# Patient Record
Sex: FEMALE | Race: WHITE | NOT HISPANIC OR LATINO
[De-identification: names, ages, dates, MRNs, and addresses within clinical notes are randomized per-mention and may not be internally consistent; named-entity substitution may affect disease eponyms.]

---

## 2018-05-11 ENCOUNTER — TRANSCRIPTION ENCOUNTER (OUTPATIENT)
Age: 58
End: 2018-05-11

## 2019-02-19 ENCOUNTER — TRANSCRIPTION ENCOUNTER (OUTPATIENT)
Age: 59
End: 2019-02-19

## 2021-08-31 ENCOUNTER — APPOINTMENT (OUTPATIENT)
Dept: GASTROENTEROLOGY | Facility: CLINIC | Age: 61
End: 2021-08-31
Payer: COMMERCIAL

## 2021-08-31 VITALS
HEIGHT: 63 IN | DIASTOLIC BLOOD PRESSURE: 78 MMHG | HEART RATE: 76 BPM | BODY MASS INDEX: 29.23 KG/M2 | SYSTOLIC BLOOD PRESSURE: 134 MMHG | WEIGHT: 165 LBS

## 2021-08-31 DIAGNOSIS — Z78.9 OTHER SPECIFIED HEALTH STATUS: ICD-10-CM

## 2021-08-31 DIAGNOSIS — E11.8 TYPE 2 DIABETES MELLITUS WITH UNSPECIFIED COMPLICATIONS: ICD-10-CM

## 2021-08-31 DIAGNOSIS — Z12.11 ENCOUNTER FOR SCREENING FOR MALIGNANT NEOPLASM OF COLON: ICD-10-CM

## 2021-08-31 PROCEDURE — 99202 OFFICE O/P NEW SF 15 MIN: CPT

## 2021-08-31 RX ORDER — SODIUM PICOSULFATE, MAGNESIUM OXIDE, AND ANHYDROUS CITRIC ACID 10; 3.5; 12 MG/160ML; G/160ML; G/160ML
10-3.5-12 MG-GM LIQUID ORAL
Qty: 2 | Refills: 0 | Status: ACTIVE | COMMUNITY
Start: 2021-08-31 | End: 1900-01-01

## 2021-08-31 NOTE — HISTORY OF PRESENT ILLNESS
[de-identified] : 60yo female for screening colonoscopy\par \par She has not had one in over 10 years\par Patient presents prior to screening colonoscopy.  Patient is asymptomatic without bleeding or change in bowel habits.  No family history of colon polyps or cancer.\par

## 2021-08-31 NOTE — PHYSICAL EXAM
[General Appearance - Alert] : alert [General Appearance - In No Acute Distress] : in no acute distress [Auscultation Breath Sounds / Voice Sounds] : lungs were clear to auscultation bilaterally [Heart Rate And Rhythm] : heart rate was normal and rhythm regular [Heart Sounds Gallop] : no gallops [Heart Sounds] : normal S1 and S2 [Murmurs] : no murmurs [Heart Sounds Pericardial Friction Rub] : no pericardial rub [Bowel Sounds] : normal bowel sounds [Abdomen Soft] : soft [Abdomen Tenderness] : non-tender [] : no hepato-splenomegaly [Abdomen Mass (___ Cm)] : no abdominal mass palpated [Abnormal Walk] : normal gait [Nail Clubbing] : no clubbing  or cyanosis of the fingernails [Musculoskeletal - Swelling] : no joint swelling seen [Motor Tone] : muscle strength and tone were normal [Oriented To Time, Place, And Person] : oriented to person, place, and time [Impaired Insight] : insight and judgment were intact [Affect] : the affect was normal

## 2021-08-31 NOTE — ASSESSMENT
[FreeTextEntry1] : 60yo female for screening colonoscopy\par \par Will check colonoscopy with clenpiq\par Risks and benefits of procedure(s) discussed with patient in detail, including but not limited to, perforation, bleeding, reaction to anesthesia, missed lesions.\par

## 2021-10-26 ENCOUNTER — APPOINTMENT (OUTPATIENT)
Dept: DISASTER EMERGENCY | Facility: CLINIC | Age: 61
End: 2021-10-26

## 2021-10-26 DIAGNOSIS — Z01.818 ENCOUNTER FOR OTHER PREPROCEDURAL EXAMINATION: ICD-10-CM

## 2021-10-27 LAB — SARS-COV-2 N GENE NPH QL NAA+PROBE: NOT DETECTED

## 2021-10-29 ENCOUNTER — APPOINTMENT (OUTPATIENT)
Dept: GASTROENTEROLOGY | Facility: AMBULATORY MEDICAL SERVICES | Age: 61
End: 2021-10-29
Payer: COMMERCIAL

## 2021-10-29 PROCEDURE — 45378 DIAGNOSTIC COLONOSCOPY: CPT

## 2021-11-16 ENCOUNTER — APPOINTMENT (OUTPATIENT)
Dept: GASTROENTEROLOGY | Facility: CLINIC | Age: 61
End: 2021-11-16

## 2023-12-30 ENCOUNTER — INPATIENT (INPATIENT)
Facility: HOSPITAL | Age: 63
LOS: 22 days | Discharge: HOME | DRG: 880 | End: 2024-01-22
Attending: PSYCHIATRY & NEUROLOGY | Admitting: PSYCHIATRY & NEUROLOGY
Payer: COMMERCIAL

## 2023-12-30 VITALS — WEIGHT: 160.06 LBS | HEIGHT: 62 IN

## 2023-12-30 DIAGNOSIS — F41.9 ANXIETY DISORDER, UNSPECIFIED: ICD-10-CM

## 2023-12-30 DIAGNOSIS — F29 UNSPECIFIED PSYCHOSIS NOT DUE TO A SUBSTANCE OR KNOWN PHYSIOLOGICAL CONDITION: ICD-10-CM

## 2023-12-30 LAB
ALBUMIN SERPL ELPH-MCNC: 4.3 G/DL — SIGNIFICANT CHANGE UP (ref 3.3–5)
ALBUMIN SERPL ELPH-MCNC: 4.3 G/DL — SIGNIFICANT CHANGE UP (ref 3.3–5)
ALP SERPL-CCNC: 84 U/L — SIGNIFICANT CHANGE UP (ref 40–120)
ALP SERPL-CCNC: 84 U/L — SIGNIFICANT CHANGE UP (ref 40–120)
ALT FLD-CCNC: 36 U/L — SIGNIFICANT CHANGE UP (ref 12–78)
ALT FLD-CCNC: 36 U/L — SIGNIFICANT CHANGE UP (ref 12–78)
AMPHET UR-MCNC: NEGATIVE — SIGNIFICANT CHANGE UP
AMPHET UR-MCNC: NEGATIVE — SIGNIFICANT CHANGE UP
ANION GAP SERPL CALC-SCNC: 5 MMOL/L — SIGNIFICANT CHANGE UP (ref 5–17)
ANION GAP SERPL CALC-SCNC: 5 MMOL/L — SIGNIFICANT CHANGE UP (ref 5–17)
APAP SERPL-MCNC: < 2 UG/ML (ref 10–30)
APAP SERPL-MCNC: < 2 UG/ML (ref 10–30)
APPEARANCE UR: CLEAR — SIGNIFICANT CHANGE UP
APPEARANCE UR: CLEAR — SIGNIFICANT CHANGE UP
AST SERPL-CCNC: 25 U/L — SIGNIFICANT CHANGE UP (ref 15–37)
AST SERPL-CCNC: 25 U/L — SIGNIFICANT CHANGE UP (ref 15–37)
BACTERIA # UR AUTO: NEGATIVE /HPF — SIGNIFICANT CHANGE UP
BACTERIA # UR AUTO: NEGATIVE /HPF — SIGNIFICANT CHANGE UP
BARBITURATES UR SCN-MCNC: NEGATIVE — SIGNIFICANT CHANGE UP
BARBITURATES UR SCN-MCNC: NEGATIVE — SIGNIFICANT CHANGE UP
BASOPHILS # BLD AUTO: 0.05 K/UL — SIGNIFICANT CHANGE UP (ref 0–0.2)
BASOPHILS # BLD AUTO: 0.05 K/UL — SIGNIFICANT CHANGE UP (ref 0–0.2)
BASOPHILS NFR BLD AUTO: 0.6 % — SIGNIFICANT CHANGE UP (ref 0–2)
BASOPHILS NFR BLD AUTO: 0.6 % — SIGNIFICANT CHANGE UP (ref 0–2)
BENZODIAZ UR-MCNC: NEGATIVE — SIGNIFICANT CHANGE UP
BENZODIAZ UR-MCNC: NEGATIVE — SIGNIFICANT CHANGE UP
BILIRUB SERPL-MCNC: 0.8 MG/DL — SIGNIFICANT CHANGE UP (ref 0.2–1.2)
BILIRUB SERPL-MCNC: 0.8 MG/DL — SIGNIFICANT CHANGE UP (ref 0.2–1.2)
BILIRUB UR-MCNC: NEGATIVE — SIGNIFICANT CHANGE UP
BILIRUB UR-MCNC: NEGATIVE — SIGNIFICANT CHANGE UP
BUN SERPL-MCNC: 20 MG/DL — SIGNIFICANT CHANGE UP (ref 7–23)
BUN SERPL-MCNC: 20 MG/DL — SIGNIFICANT CHANGE UP (ref 7–23)
CALCIUM SERPL-MCNC: 9.8 MG/DL — SIGNIFICANT CHANGE UP (ref 8.5–10.1)
CALCIUM SERPL-MCNC: 9.8 MG/DL — SIGNIFICANT CHANGE UP (ref 8.5–10.1)
CAST: 3 /LPF — SIGNIFICANT CHANGE UP (ref 0–4)
CAST: 3 /LPF — SIGNIFICANT CHANGE UP (ref 0–4)
CHLORIDE SERPL-SCNC: 110 MMOL/L — HIGH (ref 96–108)
CHLORIDE SERPL-SCNC: 110 MMOL/L — HIGH (ref 96–108)
CO2 SERPL-SCNC: 24 MMOL/L — SIGNIFICANT CHANGE UP (ref 22–31)
CO2 SERPL-SCNC: 24 MMOL/L — SIGNIFICANT CHANGE UP (ref 22–31)
COCAINE METAB.OTHER UR-MCNC: NEGATIVE — SIGNIFICANT CHANGE UP
COCAINE METAB.OTHER UR-MCNC: NEGATIVE — SIGNIFICANT CHANGE UP
COLOR SPEC: YELLOW — SIGNIFICANT CHANGE UP
COLOR SPEC: YELLOW — SIGNIFICANT CHANGE UP
CREAT SERPL-MCNC: 0.89 MG/DL — SIGNIFICANT CHANGE UP (ref 0.5–1.3)
CREAT SERPL-MCNC: 0.89 MG/DL — SIGNIFICANT CHANGE UP (ref 0.5–1.3)
DIFF PNL FLD: NEGATIVE — SIGNIFICANT CHANGE UP
DIFF PNL FLD: NEGATIVE — SIGNIFICANT CHANGE UP
EGFR: 73 ML/MIN/1.73M2 — SIGNIFICANT CHANGE UP
EGFR: 73 ML/MIN/1.73M2 — SIGNIFICANT CHANGE UP
EOSINOPHIL # BLD AUTO: 0.06 K/UL — SIGNIFICANT CHANGE UP (ref 0–0.5)
EOSINOPHIL # BLD AUTO: 0.06 K/UL — SIGNIFICANT CHANGE UP (ref 0–0.5)
EOSINOPHIL NFR BLD AUTO: 0.7 % — SIGNIFICANT CHANGE UP (ref 0–6)
EOSINOPHIL NFR BLD AUTO: 0.7 % — SIGNIFICANT CHANGE UP (ref 0–6)
ETHANOL SERPL-MCNC: <10 MG/DL — SIGNIFICANT CHANGE UP (ref 0–10)
ETHANOL SERPL-MCNC: <10 MG/DL — SIGNIFICANT CHANGE UP (ref 0–10)
GLUCOSE SERPL-MCNC: 104 MG/DL — HIGH (ref 70–99)
GLUCOSE SERPL-MCNC: 104 MG/DL — HIGH (ref 70–99)
GLUCOSE UR QL: NEGATIVE MG/DL — SIGNIFICANT CHANGE UP
GLUCOSE UR QL: NEGATIVE MG/DL — SIGNIFICANT CHANGE UP
HCT VFR BLD CALC: 48 % — HIGH (ref 34.5–45)
HCT VFR BLD CALC: 48 % — HIGH (ref 34.5–45)
HGB BLD-MCNC: 16.1 G/DL — HIGH (ref 11.5–15.5)
HGB BLD-MCNC: 16.1 G/DL — HIGH (ref 11.5–15.5)
IMM GRANULOCYTES NFR BLD AUTO: 0.3 % — SIGNIFICANT CHANGE UP (ref 0–0.9)
IMM GRANULOCYTES NFR BLD AUTO: 0.3 % — SIGNIFICANT CHANGE UP (ref 0–0.9)
KETONES UR-MCNC: 15 MG/DL
KETONES UR-MCNC: 15 MG/DL
LEUKOCYTE ESTERASE UR-ACNC: ABNORMAL
LEUKOCYTE ESTERASE UR-ACNC: ABNORMAL
LYMPHOCYTES # BLD AUTO: 2.2 K/UL — SIGNIFICANT CHANGE UP (ref 1–3.3)
LYMPHOCYTES # BLD AUTO: 2.2 K/UL — SIGNIFICANT CHANGE UP (ref 1–3.3)
LYMPHOCYTES # BLD AUTO: 25.6 % — SIGNIFICANT CHANGE UP (ref 13–44)
LYMPHOCYTES # BLD AUTO: 25.6 % — SIGNIFICANT CHANGE UP (ref 13–44)
MANUAL SMEAR VERIFICATION: SIGNIFICANT CHANGE UP
MANUAL SMEAR VERIFICATION: SIGNIFICANT CHANGE UP
MCHC RBC-ENTMCNC: 31.1 PG — SIGNIFICANT CHANGE UP (ref 27–34)
MCHC RBC-ENTMCNC: 31.1 PG — SIGNIFICANT CHANGE UP (ref 27–34)
MCHC RBC-ENTMCNC: 33.5 GM/DL — SIGNIFICANT CHANGE UP (ref 32–36)
MCHC RBC-ENTMCNC: 33.5 GM/DL — SIGNIFICANT CHANGE UP (ref 32–36)
MCV RBC AUTO: 92.7 FL — SIGNIFICANT CHANGE UP (ref 80–100)
MCV RBC AUTO: 92.7 FL — SIGNIFICANT CHANGE UP (ref 80–100)
METHADONE UR-MCNC: NEGATIVE — SIGNIFICANT CHANGE UP
METHADONE UR-MCNC: NEGATIVE — SIGNIFICANT CHANGE UP
MONOCYTES # BLD AUTO: 0.36 K/UL — SIGNIFICANT CHANGE UP (ref 0–0.9)
MONOCYTES # BLD AUTO: 0.36 K/UL — SIGNIFICANT CHANGE UP (ref 0–0.9)
MONOCYTES NFR BLD AUTO: 4.2 % — SIGNIFICANT CHANGE UP (ref 2–14)
MONOCYTES NFR BLD AUTO: 4.2 % — SIGNIFICANT CHANGE UP (ref 2–14)
NEUTROPHILS # BLD AUTO: 5.89 K/UL — SIGNIFICANT CHANGE UP (ref 1.8–7.4)
NEUTROPHILS # BLD AUTO: 5.89 K/UL — SIGNIFICANT CHANGE UP (ref 1.8–7.4)
NEUTROPHILS NFR BLD AUTO: 68.6 % — SIGNIFICANT CHANGE UP (ref 43–77)
NEUTROPHILS NFR BLD AUTO: 68.6 % — SIGNIFICANT CHANGE UP (ref 43–77)
NITRITE UR-MCNC: NEGATIVE — SIGNIFICANT CHANGE UP
NITRITE UR-MCNC: NEGATIVE — SIGNIFICANT CHANGE UP
OPIATES UR-MCNC: NEGATIVE — SIGNIFICANT CHANGE UP
OPIATES UR-MCNC: NEGATIVE — SIGNIFICANT CHANGE UP
PCP SPEC-MCNC: SIGNIFICANT CHANGE UP
PCP SPEC-MCNC: SIGNIFICANT CHANGE UP
PCP UR-MCNC: NEGATIVE — SIGNIFICANT CHANGE UP
PCP UR-MCNC: NEGATIVE — SIGNIFICANT CHANGE UP
PH UR: 6 — SIGNIFICANT CHANGE UP (ref 5–8)
PH UR: 6 — SIGNIFICANT CHANGE UP (ref 5–8)
PLAT MORPH BLD: NORMAL — SIGNIFICANT CHANGE UP
PLAT MORPH BLD: NORMAL — SIGNIFICANT CHANGE UP
PLATELET # BLD AUTO: 234 K/UL — SIGNIFICANT CHANGE UP (ref 150–400)
PLATELET # BLD AUTO: 234 K/UL — SIGNIFICANT CHANGE UP (ref 150–400)
POTASSIUM SERPL-MCNC: 4.1 MMOL/L — SIGNIFICANT CHANGE UP (ref 3.5–5.3)
POTASSIUM SERPL-MCNC: 4.1 MMOL/L — SIGNIFICANT CHANGE UP (ref 3.5–5.3)
POTASSIUM SERPL-SCNC: 4.1 MMOL/L — SIGNIFICANT CHANGE UP (ref 3.5–5.3)
POTASSIUM SERPL-SCNC: 4.1 MMOL/L — SIGNIFICANT CHANGE UP (ref 3.5–5.3)
PROT SERPL-MCNC: 8 GM/DL — SIGNIFICANT CHANGE UP (ref 6–8.3)
PROT SERPL-MCNC: 8 GM/DL — SIGNIFICANT CHANGE UP (ref 6–8.3)
PROT UR-MCNC: NEGATIVE MG/DL — SIGNIFICANT CHANGE UP
PROT UR-MCNC: NEGATIVE MG/DL — SIGNIFICANT CHANGE UP
RBC # BLD: 5.18 M/UL — SIGNIFICANT CHANGE UP (ref 3.8–5.2)
RBC # BLD: 5.18 M/UL — SIGNIFICANT CHANGE UP (ref 3.8–5.2)
RBC # FLD: 13.2 % — SIGNIFICANT CHANGE UP (ref 10.3–14.5)
RBC # FLD: 13.2 % — SIGNIFICANT CHANGE UP (ref 10.3–14.5)
RBC BLD AUTO: NORMAL — SIGNIFICANT CHANGE UP
RBC BLD AUTO: NORMAL — SIGNIFICANT CHANGE UP
RBC CASTS # UR COMP ASSIST: 1 /HPF — SIGNIFICANT CHANGE UP (ref 0–4)
RBC CASTS # UR COMP ASSIST: 1 /HPF — SIGNIFICANT CHANGE UP (ref 0–4)
SALICYLATES SERPL-MCNC: <1.7 MG/DL — LOW (ref 2.8–20)
SALICYLATES SERPL-MCNC: <1.7 MG/DL — LOW (ref 2.8–20)
SARS-COV-2 RNA SPEC QL NAA+PROBE: SIGNIFICANT CHANGE UP
SARS-COV-2 RNA SPEC QL NAA+PROBE: SIGNIFICANT CHANGE UP
SODIUM SERPL-SCNC: 139 MMOL/L — SIGNIFICANT CHANGE UP (ref 135–145)
SODIUM SERPL-SCNC: 139 MMOL/L — SIGNIFICANT CHANGE UP (ref 135–145)
SP GR SPEC: 1.02 — SIGNIFICANT CHANGE UP (ref 1–1.03)
SP GR SPEC: 1.02 — SIGNIFICANT CHANGE UP (ref 1–1.03)
SQUAMOUS # UR AUTO: 6 /HPF — HIGH (ref 0–5)
SQUAMOUS # UR AUTO: 6 /HPF — HIGH (ref 0–5)
THC UR QL: NEGATIVE — SIGNIFICANT CHANGE UP
THC UR QL: NEGATIVE — SIGNIFICANT CHANGE UP
UROBILINOGEN FLD QL: 0.2 MG/DL — SIGNIFICANT CHANGE UP (ref 0.2–1)
UROBILINOGEN FLD QL: 0.2 MG/DL — SIGNIFICANT CHANGE UP (ref 0.2–1)
WBC # BLD: 8.59 K/UL — SIGNIFICANT CHANGE UP (ref 3.8–10.5)
WBC # BLD: 8.59 K/UL — SIGNIFICANT CHANGE UP (ref 3.8–10.5)
WBC # FLD AUTO: 8.59 K/UL — SIGNIFICANT CHANGE UP (ref 3.8–10.5)
WBC # FLD AUTO: 8.59 K/UL — SIGNIFICANT CHANGE UP (ref 3.8–10.5)
WBC UR QL: 62 /HPF — HIGH (ref 0–5)
WBC UR QL: 62 /HPF — HIGH (ref 0–5)

## 2023-12-30 PROCEDURE — 93010 ELECTROCARDIOGRAM REPORT: CPT

## 2023-12-30 PROCEDURE — 99285 EMERGENCY DEPT VISIT HI MDM: CPT

## 2023-12-30 NOTE — PHARMACOTHERAPY INTERVENTION NOTE - COMMENTS
Medication history complete. Medications and allergies reviewed with patient and confirmed with . Patient states that she is not currently taking prescription medication.

## 2023-12-30 NOTE — ED BEHAVIORAL HEALTH ASSESSMENT NOTE - RISK ASSESSMENT
Acutely, patient is at low to moderate risk for suicide attempt as she notes some intent for suicide without imminent plan. Risk factors for suicide attempt include hx of prior attempts, prior inpatient admission. Protective factors include supportive friend. Risk is mitigated by inpatient admission.
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
## 2023-12-30 NOTE — ED ADULT NURSE NOTE - NSFALLUNIVINTERV_ED_ALL_ED
Bed/Stretcher in lowest position, wheels locked, appropriate side rails in place/Call bell, personal items and telephone in reach/Instruct patient to call for assistance before getting out of bed/chair/stretcher/Non-slip footwear applied when patient is off stretcher/[place] to call system/Physically safe environment - no spills, clutter or unnecessary equipment/Purposeful proactive rounding/Room/bathroom lighting operational, light cord in reach Bed/Stretcher in lowest position, wheels locked, appropriate side rails in place/Call bell, personal items and telephone in reach/Instruct patient to call for assistance before getting out of bed/chair/stretcher/Non-slip footwear applied when patient is off stretcher/[place] to call system/Physically safe environment - no spills, clutter or unnecessary equipment/Purposeful proactive rounding/Room/bathroom lighting operational, light cord in reach
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
## 2023-12-30 NOTE — ED BEHAVIORAL HEALTH ASSESSMENT NOTE - NSBHATTESTBILLING_PSY_A_CORE
[id number]-Lfkiyxtvptr diagnostic evaluation with medical services [id number]-Wyijwcopzer diagnostic evaluation with medical services

## 2023-12-30 NOTE — ED PROVIDER NOTE - PROGRESS NOTE DETAILS
pt is medically cleared. psych reccs 50mg Trazadone. vol admission. waisting for a bed. form 913 to be filled out but leaving hospital name blank because we don't know wheres going yet.
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
## 2023-12-31 DIAGNOSIS — F41.9 ANXIETY DISORDER, UNSPECIFIED: ICD-10-CM

## 2023-12-31 PROCEDURE — 82607 VITAMIN B-12: CPT

## 2023-12-31 PROCEDURE — 36415 COLL VENOUS BLD VENIPUNCTURE: CPT

## 2023-12-31 PROCEDURE — 83036 HEMOGLOBIN GLYCOSYLATED A1C: CPT

## 2023-12-31 PROCEDURE — 82306 VITAMIN D 25 HYDROXY: CPT

## 2023-12-31 PROCEDURE — 94640 AIRWAY INHALATION TREATMENT: CPT

## 2023-12-31 PROCEDURE — 80061 LIPID PANEL: CPT

## 2023-12-31 PROCEDURE — 70450 CT HEAD/BRAIN W/O DYE: CPT

## 2023-12-31 PROCEDURE — 93005 ELECTROCARDIOGRAM TRACING: CPT

## 2023-12-31 PROCEDURE — 93010 ELECTROCARDIOGRAM REPORT: CPT

## 2023-12-31 PROCEDURE — 82746 ASSAY OF FOLIC ACID SERUM: CPT

## 2023-12-31 PROCEDURE — 84443 ASSAY THYROID STIM HORMONE: CPT

## 2023-12-31 PROCEDURE — 0225U NFCT DS DNA&RNA 21 SARSCOV2: CPT

## 2023-12-31 PROCEDURE — 86803 HEPATITIS C AB TEST: CPT

## 2023-12-31 RX ORDER — HYDROXYZINE HCL 10 MG
25 TABLET ORAL EVERY 8 HOURS
Refills: 0 | Status: DISCONTINUED | OUTPATIENT
Start: 2023-12-31 | End: 2024-01-22

## 2023-12-31 RX ORDER — CEPHALEXIN 500 MG
500 CAPSULE ORAL
Refills: 0 | Status: DISCONTINUED | OUTPATIENT
Start: 2023-12-31 | End: 2024-01-16

## 2023-12-31 RX ORDER — TRAZODONE HCL 50 MG
50 TABLET ORAL AT BEDTIME
Refills: 0 | Status: DISCONTINUED | OUTPATIENT
Start: 2023-12-31 | End: 2024-01-09

## 2023-12-31 RX ORDER — TRAZODONE HCL 50 MG
50 TABLET ORAL ONCE
Refills: 0 | Status: COMPLETED | OUTPATIENT
Start: 2023-12-31 | End: 2023-12-31

## 2023-12-31 RX ORDER — QUETIAPINE FUMARATE 200 MG/1
25 TABLET, FILM COATED ORAL AT BEDTIME
Refills: 0 | Status: DISCONTINUED | OUTPATIENT
Start: 2023-12-31 | End: 2023-12-31

## 2023-12-31 RX ORDER — RISPERIDONE 4 MG/1
0.5 TABLET ORAL
Refills: 0 | Status: DISCONTINUED | OUTPATIENT
Start: 2023-12-31 | End: 2024-01-09

## 2023-12-31 RX ADMIN — Medication 50 MILLIGRAM(S): at 21:01

## 2023-12-31 RX ADMIN — Medication 50 MILLIGRAM(S): at 01:07

## 2023-12-31 RX ADMIN — RISPERIDONE 0.5 MILLIGRAM(S): 4 TABLET ORAL at 21:01

## 2023-12-31 RX ADMIN — Medication 500 MILLIGRAM(S): at 18:10

## 2023-12-31 NOTE — BH PATIENT PROFILE - HOME MEDICATIONS
magnesium oxide 400 mg oral tablet , 1 tab(s) orally once a day  ubiquinol 100 mg oral capsule , 1 cap(s) orally once a day  Caltrate 600 mg oral tablet , 2 tab(s) orally once a day  Multiple Vitamins oral tablet , 1 tab(s) orally once a day  Omega-3 1000 mg oral capsule , 1 cap(s) orally once a day

## 2023-12-31 NOTE — BH PATIENT PROFILE - FUNCTIONAL ASSESSMENT - BASIC MOBILITY 6.
4-calculated by average/Not able to assess (calculate score using [facility] averaging method) 4-calculated by average/Not able to assess (calculate score using [facility] averaging method)

## 2023-12-31 NOTE — ED ADULT NURSE REASSESSMENT NOTE - NS ED NURSE REASSESS COMMENT FT1
came on shift to find patient still clothed and with personal belongings.  pt wished to shower and was changed into gown and wanded by secruity

## 2023-12-31 NOTE — ED ADULT NURSE REASSESSMENT NOTE - NS ED NURSE REASSESS COMMENT FT1
Received report from Danyelle Madrigal RN. Pt in room, no complaints. Pt to be a voluntary admission for psych. Pt calm and cooperative awaiting psych evaluation and disposition. Comfort and safety maintained.
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
## 2023-12-31 NOTE — BH PATIENT PROFILE - FALL HARM RISK - UNIVERSAL INTERVENTIONS
Bed in lowest position, wheels locked, appropriate side rails in place/Call bell, personal items and telephone in reach/Instruct patient to call for assistance before getting out of bed or chair/Non-slip footwear when patient is out of bed/[place] to call system/Physically safe environment - no spills, clutter or unnecessary equipment/Purposeful Proactive Rounding/Room/bathroom lighting operational, light cord in reach Bed in lowest position, wheels locked, appropriate side rails in place/Call bell, personal items and telephone in reach/Instruct patient to call for assistance before getting out of bed or chair/Non-slip footwear when patient is out of bed/[place] to call system/Physically safe environment - no spills, clutter or unnecessary equipment/Purposeful Proactive Rounding/Room/bathroom lighting operational, light cord in reach
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
## 2023-12-31 NOTE — ED ADULT NURSE REASSESSMENT NOTE - NS ED NURSE REASSESS COMMENT FT1
Pt received from ANA Cannon 20:30. Pt calm and corporative. ambulatory to the bathroom, sandwich provided. voluntary admit for pshyc.

## 2024-01-01 LAB
A1C WITH ESTIMATED AVERAGE GLUCOSE RESULT: 5.2 % — SIGNIFICANT CHANGE UP (ref 4–5.6)
A1C WITH ESTIMATED AVERAGE GLUCOSE RESULT: 5.2 % — SIGNIFICANT CHANGE UP (ref 4–5.6)
CHOLEST SERPL-MCNC: 182 MG/DL — SIGNIFICANT CHANGE UP
CHOLEST SERPL-MCNC: 182 MG/DL — SIGNIFICANT CHANGE UP
ESTIMATED AVERAGE GLUCOSE: 103 MG/DL — SIGNIFICANT CHANGE UP (ref 68–114)
ESTIMATED AVERAGE GLUCOSE: 103 MG/DL — SIGNIFICANT CHANGE UP (ref 68–114)
HDLC SERPL-MCNC: 88 MG/DL — SIGNIFICANT CHANGE UP
HDLC SERPL-MCNC: 88 MG/DL — SIGNIFICANT CHANGE UP
LIPID PNL WITH DIRECT LDL SERPL: 80 MG/DL — SIGNIFICANT CHANGE UP
LIPID PNL WITH DIRECT LDL SERPL: 80 MG/DL — SIGNIFICANT CHANGE UP
NON HDL CHOLESTEROL: 94 MG/DL — SIGNIFICANT CHANGE UP
NON HDL CHOLESTEROL: 94 MG/DL — SIGNIFICANT CHANGE UP
TRIGL SERPL-MCNC: 81 MG/DL — SIGNIFICANT CHANGE UP
TRIGL SERPL-MCNC: 81 MG/DL — SIGNIFICANT CHANGE UP
TSH SERPL-MCNC: 2.81 UU/ML — SIGNIFICANT CHANGE UP (ref 0.34–4.82)
TSH SERPL-MCNC: 2.81 UU/ML — SIGNIFICANT CHANGE UP (ref 0.34–4.82)

## 2024-01-01 PROCEDURE — 99222 1ST HOSP IP/OBS MODERATE 55: CPT

## 2024-01-01 PROCEDURE — 99223 1ST HOSP IP/OBS HIGH 75: CPT

## 2024-01-01 RX ADMIN — RISPERIDONE 0.5 MILLIGRAM(S): 4 TABLET ORAL at 21:04

## 2024-01-01 RX ADMIN — Medication 500 MILLIGRAM(S): at 12:29

## 2024-01-01 RX ADMIN — Medication 500 MILLIGRAM(S): at 18:06

## 2024-01-01 RX ADMIN — RISPERIDONE 0.5 MILLIGRAM(S): 4 TABLET ORAL at 12:28

## 2024-01-01 RX ADMIN — Medication 50 MILLIGRAM(S): at 21:04

## 2024-01-01 RX ADMIN — Medication 500 MILLIGRAM(S): at 01:14

## 2024-01-01 RX ADMIN — Medication 500 MILLIGRAM(S): at 06:39

## 2024-01-01 NOTE — BH INPATIENT PSYCHIATRY ASSESSMENT NOTE - NSBHCHARTREVIEWVS_PSY_A_CORE FT
Vital Signs Last 24 Hrs  T(C): 36.7 (01-01-24 @ 06:53), Max: 36.7 (01-01-24 @ 06:53)  T(F): 98 (01-01-24 @ 06:53), Max: 98 (01-01-24 @ 06:53)  HR: --  BP: --  BP(mean): --  RR: 18 (01-01-24 @ 06:53) (18 - 18)  SpO2: 100% (01-01-24 @ 06:53) (100% - 100%)    Orthostatic VS  01-01-24 @ 06:53  Lying BP: --/-- HR: --  Sitting BP: 155/76 HR: 97  Standing BP: 142/84 HR: 103  Site: upper right arm  Mode: electronic  Orthostatic VS  12-31-23 @ 14:39  Lying BP: --/-- HR: --  Sitting BP: 148/76 HR: 90  Standing BP: 137/73 HR: 90  Site: --  Mode: --

## 2024-01-01 NOTE — H&P ADULT - NSICDXFAMILYHX_GEN_ALL_CORE_FT
FAMILY HISTORY:  Father  Still living? Unknown  Family history of cancer of larynx, Age at diagnosis: Age Unknown  FH: MI (myocardial infarction), Age at diagnosis: Age Unknown
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
## 2024-01-01 NOTE — BH INPATIENT PSYCHIATRY ASSESSMENT NOTE - RISK ASSESSMENT
low risk with the pt denying ideation intent or plan , pt indicating she is feeling safe in the hospital   mitigating pt in the hospital   protective pt with trust of a friend , and has support of her friend, help seeking behavior   chronic pt with prior psych hx

## 2024-01-01 NOTE — BH TREATMENT PLAN - NSTXCOPEINTERPR_PSY_ALL_CORE
Pt will attend 1-2 group session per day and will be able to identify 2 effective coping skills to manage deprssion by discharge.

## 2024-01-01 NOTE — H&P ADULT - ASSESSMENT
63 year old female who presented to the hospital and was admitted to psychiatry with anxiety and paranoia     Plan  1. Anxiety  - Management as per psychiatry    2. DVT PPX  - Encourage ambulation 63 year old female who presented to the hospital and was admitted to psychiatry with anxiety and paranoia     Plan  1. Anxiety  - Management as per psychiatry    2. DVT PPX  - Encourage ambulation    Medicine will sign off at this time. Reconsult as needed.
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
## 2024-01-01 NOTE — BH TREATMENT PLAN - NSTXPLANSTARTDATE_PSY_ALL_CORE
01-Jan-2024
povidone-iodine ( under 2 weeks of age or 1500 grams)/Adherence to aseptic technique: hand hygiene prior to donning barriers (gown, gloves), don cap and mask, sterile drape over patient

## 2024-01-01 NOTE — BH INPATIENT PSYCHIATRY ASSESSMENT NOTE - CURRENT MEDICATION
MEDICATIONS  (STANDING):  cephalexin 500 milliGRAM(s) Oral four times a day  risperiDONE   Tablet 0.5 milliGRAM(s) Oral two times a day  traZODone 50 milliGRAM(s) Oral at bedtime    MEDICATIONS  (PRN):  hydrOXYzine hydrochloride 25 milliGRAM(s) Oral every 8 hours PRN Anxiety

## 2024-01-01 NOTE — BH TREATMENT PLAN - ANXIETY/PANIC/FEAR NURSING INTERVENTIONS/RECOMMENDATIONS
Administer medications as appropriate and as ordered.  Maintain a calm presence and acknowledge the feelings the patient is experiencing.  Provide active-listening.  Educate patient on proper coping mechanisms.  Identify resources patient can use at home, in the future, and a plan to follow for breakthrough episodes.  Instruct/educate patient on how to use positive self-talk.

## 2024-01-01 NOTE — BH INPATIENT PSYCHIATRY ASSESSMENT NOTE - NSBHATTESTBILLING_PSY_A_CORE
[id number] diagnostic evaluation with medical services [id number]-Hlguheoghjy diagnostic evaluation with medical services Billing in another system

## 2024-01-01 NOTE — BH INPATIENT PSYCHIATRY ASSESSMENT NOTE - NSBHTIMEACTIVITIESPERFORMED_PSY_A_CORE
1. interviewed the pt to assess current mental status  2. reviewed medications    3. reviewed lab results   4. conferred with nursing concerning behavior on the unit

## 2024-01-01 NOTE — BH TREATMENT PLAN - NSTXDCOTHRINTERSW_PSY_ALL_CORE
SW will meet with patient and have contact with collaterals as pt allows, to obtain hx and input into treatment and discharge planning.  SW will engage patient in the development of an appropriate plan for discharge to include safe placement, appt for continuing o/p psychiatric treatment within 5 business days of discharge from  and resources for continued support in the community as appropriate.

## 2024-01-01 NOTE — BH INPATIENT PSYCHIATRY ASSESSMENT NOTE - NSBHROSSYSTEMS_PSY_ALL_CORE
Visit Information Date & Time Provider Department Dept. Phone Encounter #  
 3/10/2017 11:45 AM [facility] [phone] [id number] Follow-up Instructions Return in about 4 months (around 7/10/2017). Your Appointments 7/13/2017 11:45 AM  
Any with Julianne Evans MD  
[address] [place] [address] and Primary Care [address]) Appt Note: 4 MONTH FOLLOW UP  
 Yesy Garduno 89 1 [facility]  
  
   
 Yesy Garduno [address] [id number] Upcoming Health Maintenance Date Due FOBT Q 1 YEAR AGE 50-75 4/2/2016 Pneumococcal 19-64 Highest Risk (2 of 3 - PCV13) 5/17/2017 BREAST CANCER SCRN MAMMOGRAM 9/17/2017 PAP AKA CERVICAL CYTOLOGY 4/2/2018 DTaP/Tdap/Td series (2 - Td) 5/17/2026 Allergies as of 3/10/2017  Review Complete On: 3/10/2017 By: Daisy Maxwell Severity Noted Reaction Type Reactions Dilaudid [Hydromorphone (Bulk)]  10/03/2014    Itching Percocet [Oxycodone-acetaminophen]  10/03/2014    Itching Current Immunizations  Never Reviewed No immunizations on file. Not reviewed this visit You Were Diagnosed With   
  
 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 
ICD-9-CM: 401.9 Depression, unspecified depression type     ICD-10-CM: F32.9 ICD-9-CM: 639 Vitals BP Pulse Temp Resp Height(growth percentile) Weight(growth percentile) 122/70 (BP 1 Location: Left arm, BP Patient Position: Sitting) 74 98.2 °F (36.8 °C) (Oral) 18 5' 11\" (1.803 m) 198 lb 3.2 oz (89.9 kg) SpO2 BMI Smoking Status 95% 27.64 kg/m2 Never Smoker BMI and BSA Data Body Mass Index Body Surface Area  
 27.64 kg/m 2 2.12 m 2 Preferred Pharmacy Pharmacy Name Phone [phone]  [phone] Your Updated Medication List  
  
   
This list is accurate as of: 3/10/17  2:47 PM.  Always use your most recent med list.  
  
  
  
  
 AUGMENTIN 875-125 mg per tablet Generic drug:  amoxicillin-clavulanate Take 1 Tab by mouth every twelve (12) hours. citalopram 10 mg tablet Commonly known as:  CELEXA  
TAKE 1 TABLET BY MOUTH EVERY NIGHT AT BEDTIME  
  
 fentaNYL 75 mcg/hr Commonly known as:  DURAGESIC  
1 Patch by TransDERmal route every seventy-two (72) hours. Max Daily Amount: 1 Patch. fluocinoNIDE 0.05 % topical cream  
Commonly known as:  LIDEX  
two (2) times daily as needed. folic acid 1 mg tablet Commonly known as:  FOLVITE  
TAKE 1 TABLET BY MOUTH EVERY DAY  
  
 HYDROcodone-acetaminophen  mg tablet Commonly known as:  Ekaterina Oyster Take 1 Tab by mouth every six (6) hours as needed for Pain. Max Daily Amount: 4 Tabs. ibuprofen 800 mg tablet Commonly known as:  MOTRIN  
  
 losartan-hydroCHLOROthiazide 50-12.5 mg per tablet Commonly known as:  HYZAAR  
TAKE 1 TABLET BY MOUTH DAILY  
  
 OMEPRAZOLE (BULK) Take 20 mg by mouth daily. 20 mg, PO, daily, 0 Refills  
  
 promethazine 25 mg tablet Commonly known as:  PHENERGAN Take 1 tablet by mouth every six (6) hours as needed for Nausea. Prescriptions Printed Refills HYDROcodone-acetaminophen (NORCO)  mg tablet 0 Sig: Take 1 Tab by mouth every six (6) hours as needed for Pain. Max Daily Amount: 4 Tabs. Class: Print Route: Oral  
  
Follow-up Instructions Return in about 4 months (around 7/10/2017). Introducing [facility]! Sara Spencer introduces [email or web address] patient portal. Now you can access parts of your medical record, email your doctor's office, and request medication refills online.    
 
1. In your internet browser, go to [email or web address] 2. Click on the First Time User? Click Here link in the Sign In box. You will see the New Member Sign Up page. 3. Enter your Cerahelix Access Code exactly as it appears below. You will not need to use this code after youve completed the sign-up process. If you do not sign up before the expiration date, you must request a new code. · Cera[employer]ix Access Code: [email or web address] Expires: 6/8/2017  2:47 PM 
 
4. Enter the last four digits of your Social Security Number (xxxx) and Date of Birth (mm/dd/yyyy) as indicated and click Submit. You will be taken to the next sign-up page. 5. Create a Coursmost ID. This will be your Cerahelix login ID and cannot be changed, so think of one that is secure and easy to remember. 6. Create a Cerahelix password. You can change your password at any time. 7. Enter your Password Reset Question and Answer. This can be used at a later time if you forget your password. 8. Enter your e-mail address. You will receive e-mail notification when new information is available in [address] 9. Click Sign Up. You can now view and download portions of your medical record. 10. Click the Download Summary menu link to download a portable copy of your medical information. If you have questions, please visit the Frequently Asked Questions section of the Cera[employer]ix website. Remember, Cerahelix is NOT to be used for urgent needs. For medical emergencies, dial 911. Now available from your iPhone and Android! Please provide this summary of care documentation to your next provider. Your primary care clinician is listed as Ezequiel Lynne. If you have any questions after today's visit, please call [phone]. Psychiatric

## 2024-01-01 NOTE — BH INPATIENT PSYCHIATRY ASSESSMENT NOTE - NSTXDCOTHRGOAL_PSY_ALL_CORE
Patient will have safe placement upon d/c from [facility] and she will have an appointment for continuing in the appropriate level of psychiatric outpatient treatment within 5 business days of d/c from [facility].

## 2024-01-01 NOTE — BH SAFETY PLAN - STEP 5 CRISIS
HPI:    Patient ID: Ashanti Leija is a 77year old male. HPI  Patient presents with:  Medication Follow-Up: for refill on atrovastatin   no side effects. Review of Systems   Constitutional: Negative. Respiratory: Negative.     Cardiovascular: Nega
.

## 2024-01-01 NOTE — BH SAFETY PLAN - CLINICIAN PAGER OR EMERGENCY CONTACT # 1
[facility] 24hr hotline: [phone] [facility] 24hr hotline: [phone]

## 2024-01-01 NOTE — BH SAFETY PLAN - INTERNAL COPING STRATEGY 1
usually, social media - facebook and [employer]  usually, social media - facebook and [employer]

## 2024-01-01 NOTE — BH SAFETY PLAN - SUICIDE PREVENTION LIFELINE PHONES
Suicide Prevention Lifeline Phone: [phone] TALK ([phone]) Suicide Prevention Lifeline Phone: [phone]- TALK (1997)

## 2024-01-01 NOTE — BH SOCIAL WORK INITIAL PSYCHOSOCIAL EVALUATION - NSBHHOUSE_PSY_ALL_CORE
Patient lives in a private home with her .  [address] [place]  [address] [phone]/[place] alone Patient lives in a private home with her .  [address] [place]  [address] [phone]/[place] alone

## 2024-01-01 NOTE — BH SAFETY PLAN - WARNING SIGN 3
Trigger:   feeling paranoid, anxious, feeling unsafe in the home, feeling isolated  Trigger:   feeling in a state of limbo with my living situation and feeling unsafe with

## 2024-01-01 NOTE — H&P ADULT - NEGATIVE PSYCHIATRIC SYMPTOMS
The IOP is in the target range. The patient will continue the current management. no suicidal ideation/no depression/no memory loss/no paranoia/no mood swings/no agitation/no visual hallucinations

## 2024-01-01 NOTE — BH SAFETY PLAN - WARNING SIGN 2
feeling in a state of limbo with your living situation  feeling paranoid, anxious, feeling unsafe in the home, feeling isolated

## 2024-01-01 NOTE — BH SAFETY PLAN - WARNING SIGN 1
troubles with , unable to communicate with him troubles with , unable to communicate with him, feeling unsafe

## 2024-01-01 NOTE — BH SAFETY PLAN - ENVIRONMENT SAFETY 2:
exploring therapy options for after discharge exploring one on one therapy options for after discharge
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
## 2024-01-01 NOTE — BH TREATMENT PLAN - NSTXPLANTHERAPYSESSIONSFT_PSY_ALL_CORE
01-01-24  Type of therapy: Goal Setting  Type of session: Group  Level of patient participation: Attentive, Engaged  Duration of participation: 60 minutes  Therapy conducted by: [facility]  Therapy Summary: Pt attended goal setting group with encouragemnet. She verbalized feeling anxious and stated that she is learning within the process. She was able to verbalize a goal of talking to a  to help provide her with additional services for after discharge. Pt attentive within the group, respfectful to peers as they shared.     01-01-24  Type of therapy: Goal Setting  Type of session: Group  Level of patient participation: Attentive, Engaged  Duration of participation: 60 minutes  Therapy conducted by: [facility]  Therapy Summary: Pt attended goal setting group with encouragemnet. She verbalized feeling anxious and stated that she is learning within the process. She was able to verbalize a goal of talking to a  to help provide her with additional services for after discharge. Pt attentive within the group, respfectful to peers as they shared.

## 2024-01-01 NOTE — BH SAFETY PLAN - THE ONE THING THAT IS MOST IMPORTANT TO ME AND WORTH LIVING FOR IS:
"My um, just my past times" My friendships and relationships "My um, just my past times" My friendships and relationships  My independence and self worth.

## 2024-01-02 PROCEDURE — 99232 SBSQ HOSP IP/OBS MODERATE 35: CPT

## 2024-01-02 RX ADMIN — Medication 500 MILLIGRAM(S): at 13:00

## 2024-01-02 RX ADMIN — RISPERIDONE 0.5 MILLIGRAM(S): 4 TABLET ORAL at 22:02

## 2024-01-02 RX ADMIN — Medication 500 MILLIGRAM(S): at 23:08

## 2024-01-02 RX ADMIN — Medication 500 MILLIGRAM(S): at 05:54

## 2024-01-02 RX ADMIN — Medication 50 MILLIGRAM(S): at 22:01

## 2024-01-02 RX ADMIN — RISPERIDONE 0.5 MILLIGRAM(S): 4 TABLET ORAL at 08:54

## 2024-01-02 RX ADMIN — Medication 500 MILLIGRAM(S): at 17:55
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
## 2024-01-02 NOTE — BH INPATIENT PSYCHIATRY PROGRESS NOTE - NSBHFUPINTERVALHXFT_PSY_A_CORE
Patient was seen today AM, chart reviewed and discussed in team. She has been meds compliant since she came here or being  admitted she knows her  meds, and endorses that it seems to be helping at this time. She is , and has issues with her  but still living under the same roof know each other for past 30 years,  for 20 years and would like to continue meds as ordered for now. No aggression or agitation reported.

## 2024-01-02 NOTE — BH INPATIENT PSYCHIATRY PROGRESS NOTE - NSBHATTESTBILLING_PSY_A_CORE
96079-Pevnxcdmbs OBS or IP - moderate complexity OR 35-49 mins 55855-Oqewuxsopr OBS or IP - moderate complexity OR 35-49 mins

## 2024-01-03 LAB
HCV AB S/CO SERPL IA: 0.08 S/CO — SIGNIFICANT CHANGE UP (ref 0–0.99)
HCV AB S/CO SERPL IA: 0.08 S/CO — SIGNIFICANT CHANGE UP (ref 0–0.99)
HCV AB SERPL-IMP: SIGNIFICANT CHANGE UP
HCV AB SERPL-IMP: SIGNIFICANT CHANGE UP

## 2024-01-03 PROCEDURE — 99232 SBSQ HOSP IP/OBS MODERATE 35: CPT

## 2024-01-03 RX ADMIN — Medication 50 MILLIGRAM(S): at 21:04

## 2024-01-03 RX ADMIN — Medication 500 MILLIGRAM(S): at 12:34

## 2024-01-03 RX ADMIN — Medication 500 MILLIGRAM(S): at 17:35

## 2024-01-03 RX ADMIN — Medication 500 MILLIGRAM(S): at 23:35

## 2024-01-03 RX ADMIN — RISPERIDONE 0.5 MILLIGRAM(S): 4 TABLET ORAL at 21:05

## 2024-01-03 RX ADMIN — Medication 500 MILLIGRAM(S): at 07:42

## 2024-01-03 RX ADMIN — RISPERIDONE 0.5 MILLIGRAM(S): 4 TABLET ORAL at 08:02

## 2024-01-03 NOTE — BH INPATIENT PSYCHIATRY PROGRESS NOTE - NSBHATTESTBILLING_PSY_A_CORE
31816-Ghddoalygp OBS or IP - moderate complexity OR 35-49 mins 54415-Vjlozprsji OBS or IP - moderate complexity OR 35-49 mins
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
## 2024-01-03 NOTE — BH INPATIENT PSYCHIATRY PROGRESS NOTE - NSBHFUPINTERVALHXFT_PSY_A_CORE
01/03/2024: Patient was seen today AM, chart reviewed and discussed in team. Patient a quiet person, guarded, limited conversation and even though she was seen going to groups and involved in other unit activities passively. She was advised to relay any messages to the nursing staff as they nursing is here in the unit 24/7 and would be able to help in need. No meds changes as she prefers to have slow titration on her meds.    01/02/2024: Patient was seen today AM, chart reviewed and discussed in team. She has been meds compliant since she came here or being admitted she knows her  meds, and endorses that it seems to be helping at this time. She is , and has issues with her  but still living under the same roof know each other for past 30 years,  for 20 years and would like to continue meds as ordered for now. No aggression or agitation reported.

## 2024-01-04 PROCEDURE — 99232 SBSQ HOSP IP/OBS MODERATE 35: CPT

## 2024-01-04 RX ADMIN — Medication 500 MILLIGRAM(S): at 20:06

## 2024-01-04 RX ADMIN — Medication 500 MILLIGRAM(S): at 07:18

## 2024-01-04 RX ADMIN — Medication 500 MILLIGRAM(S): at 12:05

## 2024-01-04 RX ADMIN — RISPERIDONE 0.5 MILLIGRAM(S): 4 TABLET ORAL at 08:05

## 2024-01-04 RX ADMIN — RISPERIDONE 0.5 MILLIGRAM(S): 4 TABLET ORAL at 20:05

## 2024-01-04 RX ADMIN — Medication 50 MILLIGRAM(S): at 20:06

## 2024-01-04 NOTE — BH INPATIENT PSYCHIATRY PROGRESS NOTE - NSBHFUPINTERVALHXFT_PSY_A_CORE
01/04/2024: Patient was seen today AM, in day room no apparent issues guarded as she is meds compliant, and limited selected group participation. No aggression or agitation reported.  To continue current meds for stability/safety.    01/03/2024: Patient was seen today AM, chart reviewed and discussed in team. Patient a quiet person, guarded, limited conversation and even though she was seen going to groups and involved in other unit activities passively. She was advised to relay any messages to the nursing staff as they nursing is here in the unit 24/7 and would be able to help in need. No meds changes as she prefers to have slow titration on her meds.    01/02/2024: Patient was seen today AM, chart reviewed and discussed in team. She has been meds compliant since she came here or being admitted she knows her  meds, and endorses that it seems to be helping at this time. She is , and has issues with her  but still living under the same roof know each other for past 30 years,  for 20 years and would like to continue meds as ordered for now. No aggression or agitation reported.

## 2024-01-04 NOTE — BH INPATIENT PSYCHIATRY PROGRESS NOTE - NSBHATTESTBILLING_PSY_A_CORE
54581-Mkcaoruicf OBS or IP - moderate complexity OR 35-49 mins 59716-Cqdeqcvoyx OBS or IP - moderate complexity OR 35-49 mins
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
## 2024-01-05 PROCEDURE — 99231 SBSQ HOSP IP/OBS SF/LOW 25: CPT

## 2024-01-05 RX ADMIN — Medication 500 MILLIGRAM(S): at 12:50

## 2024-01-05 RX ADMIN — RISPERIDONE 0.5 MILLIGRAM(S): 4 TABLET ORAL at 20:30

## 2024-01-05 RX ADMIN — Medication 50 MILLIGRAM(S): at 20:28

## 2024-01-05 RX ADMIN — RISPERIDONE 0.5 MILLIGRAM(S): 4 TABLET ORAL at 09:58

## 2024-01-05 NOTE — BH INPATIENT PSYCHIATRY PROGRESS NOTE - NSBHCHARTREVIEWINVESTIGATE_PSY_A_CORE FT
< from: 12 Lead ECG (12.31.23 @ 02:47) >    Ventricular Rate 81 BPM    Atrial Rate 81 BPM    P-R Interval 198 ms    QRS Duration 84 ms    Q-T Interval 382 ms    QTC Calculation(Bazett) 443 ms    P Axis 61 degrees    R Axis 48 degrees    T Axis 31 degrees    Diagnosis Line Normal sinus rhythm  Low voltage QRS  Cannot rule out Anterior infarct , age undetermined  Abnormal ECG  When compared with ECG of 30-DEC-2023 16:33,  No significant change was found  Confirmed by BRYANNA JUARES MD ([id number]) on 1/1/2024 2:30:05 PM    < end of copied text >    

## 2024-01-05 NOTE — BH INPATIENT PSYCHIATRY PROGRESS NOTE - NSBHATTESTBILLING_PSY_A_CORE
[id number]-Xolaicxdnd OBS or IP - moderate complexity OR 35-49 mins 82075-Nxizxoivrw OBS or IP - moderate complexity OR 35-49 mins

## 2024-01-05 NOTE — BH INPATIENT PSYCHIATRY PROGRESS NOTE - NSBHADMITMEDEDUDETAILS_PSY_A_CORE FT
Discussed risks, Benefits of Risperdal
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
## 2024-01-05 NOTE — BH INPATIENT PSYCHIATRY PROGRESS NOTE - NSBHFUPINTERVALHXFT_PSY_A_CORE
01/05/2024: Patient was seen today in her room, smiling a little, low volume speech, guarded, and minimal conversation. Meds compliant, unable to discern the paranoia as she endorses " I'm fine ". No meds were changed till now. She had not called her  and her  has not visited her yet.    01/04/2024: Patient was seen today AM, in day room no apparent issues guarded as she is meds compliant, and limited selected group participation. No aggression or agitation reported.  To continue current meds for stability/safety.    01/03/2024: Patient was seen today AM, chart reviewed and discussed in team. Patient a quiet person, guarded, limited conversation and even though she was seen going to groups and involved in other unit activities passively. She was advised to relay any messages to the nursing staff as they nursing is here in the unit 24/7 and would be able to help in need. No meds changes as she prefers to have slow titration on her meds.    01/02/2024: Patient was seen today AM, chart reviewed and discussed in team. She has been meds compliant since she came here or being admitted she knows her  meds, and endorses that it seems to be helping at this time. She is , and has issues with her  but still living under the same roof know each other for past 30 years,  for 20 years and would like to continue meds as ordered for now. No aggression or agitation reported.

## 2024-01-06 RX ADMIN — Medication 50 MILLIGRAM(S): at 20:22

## 2024-01-06 RX ADMIN — RISPERIDONE 0.5 MILLIGRAM(S): 4 TABLET ORAL at 20:22

## 2024-01-06 RX ADMIN — RISPERIDONE 0.5 MILLIGRAM(S): 4 TABLET ORAL at 09:27

## 2024-01-07 RX ADMIN — Medication 50 MILLIGRAM(S): at 20:48

## 2024-01-07 RX ADMIN — RISPERIDONE 0.5 MILLIGRAM(S): 4 TABLET ORAL at 20:48

## 2024-01-07 RX ADMIN — RISPERIDONE 0.5 MILLIGRAM(S): 4 TABLET ORAL at 09:07

## 2024-01-08 PROCEDURE — 99232 SBSQ HOSP IP/OBS MODERATE 35: CPT

## 2024-01-08 RX ADMIN — Medication 50 MILLIGRAM(S): at 21:38

## 2024-01-08 RX ADMIN — RISPERIDONE 0.5 MILLIGRAM(S): 4 TABLET ORAL at 09:36

## 2024-01-08 RX ADMIN — RISPERIDONE 0.5 MILLIGRAM(S): 4 TABLET ORAL at 21:38
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
## 2024-01-09 PROCEDURE — 99232 SBSQ HOSP IP/OBS MODERATE 35: CPT

## 2024-01-09 RX ORDER — TRAZODONE HCL 50 MG
100 TABLET ORAL AT BEDTIME
Refills: 0 | Status: DISCONTINUED | OUTPATIENT
Start: 2024-01-09 | End: 2024-01-22

## 2024-01-09 RX ORDER — RISPERIDONE 4 MG/1
1 TABLET ORAL DAILY
Refills: 0 | Status: DISCONTINUED | OUTPATIENT
Start: 2024-01-09 | End: 2024-01-11

## 2024-01-09 RX ORDER — QUETIAPINE FUMARATE 200 MG/1
50 TABLET, FILM COATED ORAL AT BEDTIME
Refills: 0 | Status: DISCONTINUED | OUTPATIENT
Start: 2024-01-09 | End: 2024-01-22

## 2024-01-09 RX ORDER — RISPERIDONE 4 MG/1
2 TABLET ORAL AT BEDTIME
Refills: 0 | Status: DISCONTINUED | OUTPATIENT
Start: 2024-01-09 | End: 2024-01-12

## 2024-01-09 RX ORDER — LANOLIN ALCOHOL/MO/W.PET/CERES
5 CREAM (GRAM) TOPICAL AT BEDTIME
Refills: 0 | Status: DISCONTINUED | OUTPATIENT
Start: 2024-01-09 | End: 2024-01-22

## 2024-01-09 RX ADMIN — Medication 100 MILLIGRAM(S): at 21:03

## 2024-01-09 RX ADMIN — Medication 5 MILLIGRAM(S): at 21:03

## 2024-01-09 RX ADMIN — RISPERIDONE 2 MILLIGRAM(S): 4 TABLET ORAL at 21:03

## 2024-01-09 RX ADMIN — RISPERIDONE 0.5 MILLIGRAM(S): 4 TABLET ORAL at 10:18
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
## 2024-01-10 PROCEDURE — 99233 SBSQ HOSP IP/OBS HIGH 50: CPT

## 2024-01-10 RX ADMIN — RISPERIDONE 1 MILLIGRAM(S): 4 TABLET ORAL at 09:32

## 2024-01-10 RX ADMIN — Medication 100 MILLIGRAM(S): at 21:43

## 2024-01-10 RX ADMIN — RISPERIDONE 2 MILLIGRAM(S): 4 TABLET ORAL at 21:43

## 2024-01-10 RX ADMIN — Medication 5 MILLIGRAM(S): at 21:43
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
## 2024-01-10 NOTE — BH INPATIENT PSYCHIATRY PROGRESS NOTE - NSBHASSESSSUMMFT_PSY_ALL_CORE
pt denies any suicidal ideation intent or plan .  Pt with continued paranoia mostly concerning the  ,  pt with suspiciousness.   mitigating pt with the continuation of medication  protective with a place to live , social support   chronic pt with prior psych hosp

## 2024-01-11 LAB
24R-OH-CALCIDIOL SERPL-MCNC: 98.9 NG/ML — HIGH (ref 30–80)
24R-OH-CALCIDIOL SERPL-MCNC: 98.9 NG/ML — HIGH (ref 30–80)
FOLATE SERPL-MCNC: >20 NG/ML — SIGNIFICANT CHANGE UP
FOLATE SERPL-MCNC: >20 NG/ML — SIGNIFICANT CHANGE UP
VIT B12 SERPL-MCNC: 706 PG/ML — SIGNIFICANT CHANGE UP (ref 232–1245)
VIT B12 SERPL-MCNC: 706 PG/ML — SIGNIFICANT CHANGE UP (ref 232–1245)

## 2024-01-11 PROCEDURE — 99232 SBSQ HOSP IP/OBS MODERATE 35: CPT

## 2024-01-11 PROCEDURE — 70450 CT HEAD/BRAIN W/O DYE: CPT | Mod: 26

## 2024-01-11 RX ORDER — RISPERIDONE 4 MG/1
2 TABLET ORAL DAILY
Refills: 0 | Status: DISCONTINUED | OUTPATIENT
Start: 2024-01-12 | End: 2024-01-12

## 2024-01-11 RX ADMIN — Medication 5 MILLIGRAM(S): at 22:01

## 2024-01-11 RX ADMIN — Medication 100 MILLIGRAM(S): at 22:01

## 2024-01-11 RX ADMIN — RISPERIDONE 2 MILLIGRAM(S): 4 TABLET ORAL at 22:01

## 2024-01-11 RX ADMIN — RISPERIDONE 1 MILLIGRAM(S): 4 TABLET ORAL at 10:55
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
## 2024-01-11 NOTE — BH INPATIENT PSYCHIATRY PROGRESS NOTE - NSBHASSESSSUMMFT_PSY_ALL_CORE
low to moderate risk as the pt still with paranoid thoughts , isolation and avoidance , pt remains superficial   mitigating pt with continued medication   protective pt with place to live   chronic pt with prior SA and psych hx

## 2024-01-12 DIAGNOSIS — F43.10 POST-TRAUMATIC STRESS DISORDER, UNSPECIFIED: ICD-10-CM

## 2024-01-12 PROCEDURE — 99233 SBSQ HOSP IP/OBS HIGH 50: CPT

## 2024-01-12 RX ORDER — RISPERIDONE 4 MG/1
2 TABLET ORAL
Refills: 0 | Status: DISCONTINUED | OUTPATIENT
Start: 2024-01-12 | End: 2024-01-13

## 2024-01-12 RX ADMIN — Medication 100 MILLIGRAM(S): at 21:18

## 2024-01-12 RX ADMIN — RISPERIDONE 2 MILLIGRAM(S): 4 TABLET ORAL at 21:16

## 2024-01-12 RX ADMIN — RISPERIDONE 2 MILLIGRAM(S): 4 TABLET ORAL at 10:08

## 2024-01-12 RX ADMIN — Medication 5 MILLIGRAM(S): at 21:16

## 2024-01-12 RX ADMIN — QUETIAPINE FUMARATE 50 MILLIGRAM(S): 200 TABLET, FILM COATED ORAL at 21:15
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
## 2024-01-12 NOTE — BH INPATIENT PSYCHIATRY PROGRESS NOTE - NSBHMSESPABN_PSY_A_CORE
Soft volume/Slowed rate

## 2024-01-12 NOTE — BH INPATIENT PSYCHIATRY PROGRESS NOTE - NSBHMSEATTEN_PSY_A_CORE
MEDICARE WELLNESS VISIT NOTE    HISTORY OF PRESENT ILLNESS:   Liane Dutton presents for his Subsequent Annual Medicare Wellness Visit.      Patient Care Team:  Beti Neville DO as PCP - General (Family Practice)  Carlos Manuel Heath DO as Pulmonary Medicine (Internal Medicine - Pulmonary Disease)        Patient Active Problem List   Diagnosis   • Tobacco use disorder   • Essential hypertension, benign   • Chest pain   • Polyarticular arthritis   • Alcohol use disorder, mild, in early remission   • Carpal tunnel syndrome on both sides   • Cervical radiculopathy   • Left inguinal hernia   • Uncomplicated opioid dependence (CMD)         Past Medical History:   Diagnosis Date   • Arthritis    • Chronic pain     neck, carpal tunnel syndrome, left hip, back   • COPD, severe (CMD)     Followed by Dr. Carlos Manuel Heath [phone]    • Degenerative disc disease    • Depression    • Essential (primary) hypertension    • Gastroesophageal reflux disease    • Hepatitis     \"liver hepatitis as a child\" per pt   • Inguinal hernia    • Pneumonia    • Pulmonary nodule    • RAD (reactive airway disease)    • Stomach ulcer     x3   • Tobacco use     Followed by Dr. Carlos Manuel Heath [phone]          Past Surgical History:   Procedure Laterality Date   • Hernia repair     • Upper gi endoscopy,tumor ablatn           Social History     Tobacco Use   • Smoking status: Every Day     Current packs/day: 0.50     Average packs/day: 0.5 packs/day for 55.4 years (27.7 pk-yrs)     Types: Cigarettes     Start date: 1/1/1968     Passive exposure: Past   • Smokeless tobacco: Never   • Tobacco comments:     .5 ppd   Substance Use Topics   • Alcohol use: No     Alcohol/week: 0.0 standard drinks of alcohol     Comment: quit drinking march 2015   • Drug use: No     Types: Prescription Drugs     Drug use:    Drug Use:    No              Family History   Problem Relation Age of Onset   • Heart disease Mother    • Cancer Father    • COPD Sister        Current  Outpatient Medications   Medication Sig Dispense Refill   • fluticasone-umeclidin-vilanterol (Trelegy Ellipta) 100-62.5-25 MCG/ACT inhaler Inhale 1 puff into the lungs daily. 180 each 3   • albuterol 108 (90 Base) MCG/ACT inhaler Inhale 2 puffs into the lungs every 4 hours as needed for Shortness of Breath or Wheezing. 25.5 g 2   • HYDROcodone-acetaminophen (NORCO) 5-325 MG per tablet Take 1 tablet by mouth 2 times daily as needed for Pain. 10 tablet 0   • omeprazole (PriLOSEC) 40 MG capsule Take 1 capsule by mouth in the morning and 1 capsule in the evening. APPOINTMENT NEEDED 60 capsule 2   • lisinopril (ZESTRIL) 20 MG tablet Take 2 tablets by mouth daily. Appointment needed before any further refills 60 tablet 0   • amLODIPine (NORVASC) 10 MG tablet Take 1 tablet by mouth daily. Appointment needed before any further refills 30 tablet 0   • gabapentin (NEURONTIN) 600 MG tablet Take 1 tablet by mouth every 4 to 6 hours. 150 tablet 2   • busPIRone (BUSPAR) 5 MG tablet Take 1 tablet by mouth 3 times daily. 90 tablet 2   • albuterol (VENTOLIN) (2.5 MG/3ML) 0.083% nebulizer solution Take 3 mLs by nebulization 4 times daily. (Patient taking differently: Take 2.5 mg by nebulization 4 times daily as needed for Shortness of Breath.) 75 mL 11   • aspirin 325 MG tablet Take 1 tablet by mouth daily. For Blood clot prevention 60 tablet 0   • acetaminophen (TYLENOL) 325 MG tablet Take 650 mg by mouth every 4 hours as needed for Pain.       No current facility-administered medications for this visit.        The following items on the Medicare Health Risk Assessment were found to be positive  1.) Do you have an Advance directive, living will, or power of  for health care document that contains your wishes for end of life care?: No     2.) Would you like additional information on advance directives?: Yes     3.) During the past 4 weeks, how would you rate your health?: Fair     4.) During the past 4 weeks, what was the  hardest physical activity you could do for at least 2 minutes?: Light     11d.) Bodily pain: Often         Vision and Hearing screens: Not applicable    Advance care planning documents on file - no     Cognitive/Functional Status: no evidence of cognitive dysfunction by direct observation    Opioid Review: Liane is not taking opioid medications.    Recent PHQ 2/9 Score:    PHQ 2:  Date Adult PHQ 2 Score Adult PHQ 2 Interpretation   5/17/2023 1 No further screening needed     DEPRESSION ASSESSMENT/PLAN:  Depression screening is negative no further plan needed.     Body mass index is 21.27 kg/m².    Needed Screening/Treatment:   Cardiovascular screening - Abdominal aortic US, Colorectal cancer screening , Lung Cancer Screening and Immunizations reviewed and patient needs: COVID-19 and Herpes Zoster  Needed follow up:  None    See orders.   See Patient Instructions section.   Return in about 1 year (around 5/17/2024) for Medicare Wellness Visit AND 4-6 wks recheck abd pain, review results; sooner as needed.   -------------------------------------------------------    [place] AMBULATORY ENCOUNTER  FAMILY PRACTICE OFFICE VISIT    CHIEF COMPLAINT:    Office Visit, Medicare Wellness Visit (Subsequent), and Follow-up      SUBJECTIVE:    Liane Dutton is a 68 year old male who presents with history of HTN, COPD, tobacco use, anxiety, chronic neck and back pain. No flank pain. Stable on current regimen. Smokes cigarettes, 1/2 ppd, started smoking age 13, at most 1 ppd. No ready to quit completely.    Pt c/o testicular swelling left x few months and abdominal pain in this area with palpation. Did get US testicles 12/19/22, showing large left hydrocele. Admits when he had left hip surgery, had hematoma, but seems to be connected. Pt never completed CT abd/pelvis, but admits he is claustrophobia.    REVIEW OF SYSTEMS:    Constitutional:  No fever or chills.    Eyes:  No eye erythema or eye discharge.    Ears, Nose, Throat:  No nasal  congestion, sore throat, or ear pain.  Respiratory:  No wheezing, cough, or shortness of breath.   Cardiovascular:  No chest pain, edema, or palpitations.     Gastrointestinal:  No nausea, vomiting, or rectal bleeding.  : No dysuria, retention, frequency, or hematuria.   Musculoskeletal:  No joint redness or swelling.   Neurologic:  No slurred speech, facial or limb paralysis, headache, dizziness, or confusion.  Psychiatric:  No depressed or anxious mood.      OBJECTIVE:  PROBLEM LIST:    Patient Active Problem List   Diagnosis   • Tobacco use disorder   • Essential hypertension, benign   • Chest pain   • Polyarticular arthritis   • Alcohol use disorder, mild, in early remission   • Carpal tunnel syndrome on both sides   • Cervical radiculopathy   • Left inguinal hernia   • Uncomplicated opioid dependence (CMD)       PAST HISTORIES:  I have reviewed the past medical history, family history, social history, medications and allergies listed in the medical record as obtained by my nursing staff and support staff and agree with their documentation.    Past Medical History:   Diagnosis Date   • Arthritis    • Chronic pain     neck, carpal tunnel syndrome, left hip, back   • COPD, severe (CMD)     Followed by Dr. Carlos Manuel Heath [phone]    • Degenerative disc disease    • Depression    • Essential (primary) hypertension    • Gastroesophageal reflux disease    • Hepatitis     \"liver hepatitis as a child\" per pt   • Inguinal hernia    • Pneumonia    • Pulmonary nodule    • RAD (reactive airway disease)    • Stomach ulcer     x3   • Tobacco use     Followed by Dr. Carlos Manuel Heath [phone]      Past Surgical History:   Procedure Laterality Date   • Hernia repair     • Upper gi endoscopy,tumor ablatn       Social History     Tobacco Use   • Smoking status: Every Day     Current packs/day: 0.50     Average packs/day: 0.5 packs/day for 55.4 years (27.7 pk-yrs)     Types: Cigarettes     Start date: 1/1/1968     Passive  exposure: Past   • Smokeless tobacco: Never   • Tobacco comments:     .5 ppd   Substance Use Topics   • Alcohol use: No     Alcohol/week: 0.0 standard drinks of alcohol     Comment: quit drinking march 2015   • Drug use: No     Types: Prescription Drugs     Family History   Problem Relation Age of Onset   • Heart disease Mother    • Cancer Father    • COPD Sister      Current Outpatient Medications   Medication Sig   • fluticasone-umeclidin-vilanterol (Trelegy Ellipta) 100-62.5-25 MCG/ACT inhaler Inhale 1 puff into the lungs daily.   • albuterol 108 (90 Base) MCG/ACT inhaler Inhale 2 puffs into the lungs every 4 hours as needed for Shortness of Breath or Wheezing.   • HYDROcodone-acetaminophen (NORCO) 5-325 MG per tablet Take 1 tablet by mouth 2 times daily as needed for Pain.   • omeprazole (PriLOSEC) 40 MG capsule Take 1 capsule by mouth in the morning and 1 capsule in the evening. APPOINTMENT NEEDED   • lisinopril (ZESTRIL) 20 MG tablet Take 2 tablets by mouth daily. Appointment needed before any further refills   • amLODIPine (NORVASC) 10 MG tablet Take 1 tablet by mouth daily. Appointment needed before any further refills   • gabapentin (NEURONTIN) 600 MG tablet Take 1 tablet by mouth every 4 to 6 hours.   • busPIRone (BUSPAR) 5 MG tablet Take 1 tablet by mouth 3 times daily.   • albuterol (VENTOLIN) (2.5 MG/3ML) 0.083% nebulizer solution Take 3 mLs by nebulization 4 times daily. (Patient taking differently: Take 2.5 mg by nebulization 4 times daily as needed for Shortness of Breath.)   • aspirin 325 MG tablet Take 1 tablet by mouth daily. For Blood clot prevention   • acetaminophen (TYLENOL) 325 MG tablet Take 650 mg by mouth every 4 hours as needed for Pain.     No current facility-administered medications for this visit.     ALLERGIES:   Allergen Reactions   • Aspirin Nausea & Vomiting   • Lyrica Other (See Comments)     paranoia       PHYSICAL EXAM:  Visit Vitals  /70 (BP Location: LUE - Left upper  extremity, Patient Position: Sitting)   Pulse (!) 102   Ht 5' 7\" (1.702 m)   Wt 61.6 kg (135 lb 12.9 oz)   SpO2 93%   BMI 21.27 kg/m²     Pulse Ox Interpretation:  Within normal limits.  General:   Alert, cooperative, conversive in no acute distress.  Skin:  Warm and dry without suspicious rash or skin lesions on exposed skin.    Head:  Normocephalic, atraumatic.   Neck:  Trachea is midline. Neck supple and nontender.   Eyes:  Normal conjunctivae and sclerae. No eye discharge.  Cardiovascular: Regular rate and rhythm without murmur.  Respiratory:   Normal respiratory effort.  Clear to auscultation. No wheezes, rales or rhonchi.  Gastrointestinal:  Soft. TTP of left scrotum with tender cystic mass. Nondistended.   Musculoskeletal:  No deformity or edema on exposed extremities. Gait intact.  Neurologic:  Oriented x3. Speech normal. Mental status intact.  Psychiatric:  Cooperative.  Appropriate mood and affect.    Recent Review Flowsheet Data     Date 5/17/2023    Adult PHQ 2 Score 1    Adult PHQ 2 Interpretation No further screening needed    Little interest or pleasure in activity? Not at all    Feeling down, depressed or hopeless? Several days          DEPRESSION ASSESSMENT/PLAN:  Depression screening is negative no further plan needed.     LAB RESULTS:  No visits with results within 1 Month(s) from this visit.   Latest known visit with results is:   Lab Services on 12/12/2022   Component Date Value Ref Range Status   • Sodium 12/12/2022 137  135 - 145 mmol/L Final   • Potassium 12/12/2022 4.2  3.4 - 5.1 mmol/L Final   • Chloride 12/12/2022 104  97 - 110 mmol/L Final   • Carbon Dioxide 12/12/2022 24  21 - 32 mmol/L Final   • Anion Gap 12/12/2022 13  7 - 19 mmol/L Final   • Glucose 12/12/2022 105 (H)  70 - 99 mg/dL Final   • BUN 12/12/2022 13  6 - 20 mg/dL Final   • Creatinine 12/12/2022 0.48 (L)  0.67 - 1.17 mg/dL Final   • Glomerular Filtration Rate 12/12/2022 >90  >=60 Final    eGFR results = or >60 mL/min/1.73m2  = Normal kidney function. Estimated GFR calculated using the CKD-EPI-R (2021) equation that does not include race in the creatinine calculation.   • BUN/Cr 12/12/2022 27 (H)  7 - 25 Final   • Calcium 12/12/2022 9.1  8.4 - 10.2 mg/dL Final   • Bilirubin, Total 12/12/2022 0.2  0.2 - 1.0 mg/dL Final   • GOT/AST 12/12/2022 17  <=37 Units/L Final   • GPT/ALT 12/12/2022 33  <64 Units/L Final   • Alkaline Phosphatase 12/12/2022 72  45 - 117 Units/L Final   • Albumin 12/12/2022 4.2  3.6 - 5.1 g/dL Final   • Protein, Total 12/12/2022 6.8  6.4 - 8.2 g/dL Final   • Globulin 12/12/2022 2.6  2.0 - 4.0 g/dL Final   • A/G Ratio 12/12/2022 1.6  1.0 - 2.4 Final   • TSH 12/12/2022 1.219  0.350 - 5.000 mcUnits/mL Final   • RBC Sedimentation Rate 12/12/2022 3  0 - 20 mm/hr Final   • C-Reactive Protein 12/12/2022 <0.3  <=1.0 mg/dL Final   • Hepatitis C Antibody 12/12/2022 Negative  Negative Final   • Prostate Specific Antigen 12/12/2022 0.14  <=4.50 ng/mL Final    [employer] Chemiluminescence. Results obtained with different assay methods or kits cannot be used interchangeably.     • WBC 12/12/2022 6.6  4.2 - 11.0 K/mcL Final   • RBC 12/12/2022 4.13 (L)  4.50 - 5.90 mil/mcL Final   • HGB 12/12/2022 13.7  13.0 - 17.0 g/dL Final   • HCT 12/12/2022 40.6  39.0 - 51.0 % Final   • MCV 12/12/2022 98.3  78.0 - 100.0 fl Final   • MCH 12/12/2022 33.2  26.0 - 34.0 pg Final   • MCHC 12/12/2022 33.7  32.0 - 36.5 g/dL Final   • RDW-CV 12/12/2022 12.9  11.0 - 15.0 % Final   • RDW-SD 12/12/2022 46.3  39.0 - 50.0 fL Final   • PLT 12/12/2022 292  140 - 450 K/mcL Final   • NRBC 12/12/2022 0  <=0 /100 WBC Final   • Neutrophil, Percent 12/12/2022 55  % Final   • Lymphocytes, Percent 12/12/2022 26  % Final   • Mono, Percent 12/12/2022 11  % Final   • Eosinophils, Percent 12/12/2022 7  % Final   • Basophils, Percent 12/12/2022 1  % Final   • Immature Granulocytes 12/12/2022 0  % Final   • Absolute Neutrophils 12/12/2022 3.5  1.8 - 7.7 K/mcL Final   •  Absolute Lymphocytes 12/12/2022 1.7  1.0 - 4.0 K/mcL Final   • Absolute Monocytes 12/12/2022 0.7  0.3 - 0.9 K/mcL Final   • Absolute Eosinophils  12/12/2022 0.5  0.0 - 0.5 K/mcL Final   • Absolute Basophils 12/12/2022 0.1  0.0 - 0.3 K/mcL Final   • Absolute Immature Granulocytes 12/12/2022 0.0  0.0 - 0.2 K/mcL Final       ASSESSMENT/PLAN:   1. Medicare annual wellness visit, subsequent  Medicare wellness exam done.  Health maintenance, anticipatory guidance covered.    2. COPD, severe (CMD)  Rx Trelegy Ellipta.  Order CT lungs STAT.  - fluticasone-umeclidin-vilanterol (Trelegy Ellipta) 100-62.5-25 MCG/ACT inhaler; Inhale 1 puff into the lungs daily.  Dispense: 180 each; Refill: 3  - albuterol 108 (90 Base) MCG/ACT inhaler; Inhale 2 puffs into the lungs every 4 hours as needed for Shortness of Breath or Wheezing.  Dispense: 25.5 g; Refill: 2    3. Need for lipid screening  Check fasting lipids.  - Lipid Panel With Reflex; Future    4. Screening for lung cancer  Advise tobacco cessation.  Order CT lungs STAT.  - CT Lung Cancer Screening Low Dose WO Contrast; Future    5. Generalized abdominal pain  Discussed last test results in more detail.  - CT ABDOMEN PELVIS W CONTRAST; Future    6. Abdominal mass, LLQ (left lower quadrant)  Discussed last test results in more detail.  Referral to urology.  Order CT abd/pelvis w/ contrast STAT.  Rx Norco 5/325 mg 1 bid prn left scrotal pain #10. Short-term only.  Pain contract completed for [employer].  - CT ABDOMEN PELVIS W CONTRAST; Future  - HYDROcodone-acetaminophen (NORCO) 5-325 MG per tablet; Take 1 tablet by mouth 2 times daily as needed for Pain.  Dispense: 10 tablet; Refill: 0    7. Hydrocele, left  Referral to urology.  Order CT abd/pelvis w/ contrast STAT.  Rx Norco 5/325 mg 1 bid prn left scrotal pain #10. Short-term only.  - SERVICE TO UROLOGY  - HYDROcodone-acetaminophen (NORCO) 5-325 MG per tablet; Take 1 tablet by mouth 2 times daily as needed for Pain.  Dispense: 10  tablet; Refill: 0    8. Personal history of tobacco use  Advise tobacco cessation.  Order CT lungs STAT.  - CT Lung Cancer Screening Low Dose WO Contrast; Future    9. Scrotal pain  As in #7.  - HYDROcodone-acetaminophen (NORCO) 5-325 MG per tablet; Take 1 tablet by mouth 2 times daily as needed for Pain.  Dispense: 10 tablet; Refill: 0    Additional plan/comments:  F/u in 1 month for recheck abd pain; sooner as needed.    Orders Placed This Encounter   • CT Lung Cancer Screening Low Dose WO Contrast   • CT ABDOMEN PELVIS W CONTRAST   • Lipid Panel With Reflex   • SERVICE TO UROLOGY   • fluticasone-umeclidin-vilanterol (Trelegy Ellipta) 100-62.5-25 MCG/ACT inhaler   • albuterol 108 (90 Base) MCG/ACT inhaler   • HYDROcodone-acetaminophen (NORCO) 5-325 MG per tablet       Return in about 1 year (around 5/17/2024) for Medicare Wellness Visit AND 4-6 wks recheck abd pain, review results; sooner as needed.    Future Appointments   Date Time Provider Department Center   6/7/2023 11:45 AM Beti Neville DO [facility]   6/20/2023  1:30 PM [facility] CT [id number]   6/20/2023  2:00 PM [facility] CT [id number]   6/27/2023  1:45 PM Beti Neville, DO [facility]       Instructions provided as documented above and in the After Visit Summary (AVS).    Discussion summary:  Reviewed and discussed with patient and/or patient's legal guardian the patient's medical history, differential diagnoses, and management. above plan and management and warning signs. Medications and their side effects and benefits, if prescribed, were also discussed. Advised patient to return to clinic or contact office as soon as possible if any questions or concerns.  Plan discussed with and agreed to by: patient.  Disposition: stable.  Discharged: home.    Medical decision making:  The above plan was discussed with patient and/or patient's guardian, who understand(s) and agree(s) to above plan. The total time, including more than 1/2 of time spent on counseling, chart  review, documentation, and/or coordination of care was 36 minutes.    Beti Flores, DO  Family Medicine and Osteopathic Manipulative Therapy                   Impaired

## 2024-01-12 NOTE — BH INPATIENT PSYCHIATRY PROGRESS NOTE - NSBHASSESSSUMMFT_PSY_ALL_CORE
low risk as the pt denies any suicidal ideation intent or plan .  Pt with hopes of living with her friend and later relocate as she gets a new place to live  mitigating pt taking medication   protective pt  with place to live , social support

## 2024-01-13 PROCEDURE — 99232 SBSQ HOSP IP/OBS MODERATE 35: CPT

## 2024-01-13 RX ORDER — RISPERIDONE 4 MG/1
1 TABLET ORAL
Refills: 0 | Status: DISCONTINUED | OUTPATIENT
Start: 2024-01-13 | End: 2024-01-22

## 2024-01-13 RX ADMIN — Medication 100 MILLIGRAM(S): at 21:22

## 2024-01-13 RX ADMIN — RISPERIDONE 2 MILLIGRAM(S): 4 TABLET ORAL at 10:14

## 2024-01-13 RX ADMIN — Medication 5 MILLIGRAM(S): at 21:22

## 2024-01-13 NOTE — BH INPATIENT PSYCHIATRY PROGRESS NOTE - NSBHTIMEACTIVITIESPERFORMED_PSY_A_CORE
1. interviewed the pt to assess current mental status  2. reviewed medications    3. reviewed lab results   4. conferred with nursing concerning behavior on the unit      
1. interviewed the pt to assess current mental status  2. reviewed medications  continuing with the Risperdal  2mg po BID  3. reviewed lab results with the pt   4. conferred with social work    5. conferred with  nursing and therapist concerning pt behavior on the unit
1. interviewed the pt to assess current mental status  2. reviewed medications  continuing with the Risperdal  2mg po BID  3. reviewed lab results with the pt   4. conferred with social work    5. conferred with  nursing and therapist concerning pt behavior on the unit
1. interviewed the pt to assess current mental status  2. reviewed medications    3. reviewed lab results   4. conferred with nursing concerning behavior on the unit      
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
## 2024-01-13 NOTE — BH INPATIENT PSYCHIATRY PROGRESS NOTE - NSBHATTESTBILLING_PSY_A_CORE
09041-[id number] - moderate complexity OR 30-39 mins 43574-Cpasqncadu - moderate complexity OR 30-39 mins

## 2024-01-14 RX ADMIN — Medication 100 MILLIGRAM(S): at 20:34

## 2024-01-14 RX ADMIN — RISPERIDONE 1 MILLIGRAM(S): 4 TABLET ORAL at 09:43

## 2024-01-14 RX ADMIN — Medication 5 MILLIGRAM(S): at 21:36

## 2024-01-14 RX ADMIN — RISPERIDONE 1 MILLIGRAM(S): 4 TABLET ORAL at 20:35

## 2024-01-15 RX ADMIN — Medication 5 MILLIGRAM(S): at 20:59

## 2024-01-15 RX ADMIN — RISPERIDONE 1 MILLIGRAM(S): 4 TABLET ORAL at 20:59

## 2024-01-15 RX ADMIN — Medication 100 MILLIGRAM(S): at 20:59

## 2024-01-15 RX ADMIN — RISPERIDONE 1 MILLIGRAM(S): 4 TABLET ORAL at 09:43

## 2024-01-16 LAB
RAPID RVP RESULT: DETECTED
RSV RNA SPEC QL NAA+PROBE: DETECTED
SARS-COV-2 RNA SPEC QL NAA+PROBE: SIGNIFICANT CHANGE UP

## 2024-01-16 PROCEDURE — 99232 SBSQ HOSP IP/OBS MODERATE 35: CPT

## 2024-01-16 RX ORDER — BENZOCAINE AND MENTHOL 5; 1 G/100ML; G/100ML
1 LIQUID ORAL EVERY 4 HOURS
Refills: 0 | Status: DISCONTINUED | OUTPATIENT
Start: 2024-01-16 | End: 2024-01-22

## 2024-01-16 RX ORDER — ACETAMINOPHEN 500 MG
650 TABLET ORAL EVERY 6 HOURS
Refills: 0 | Status: DISCONTINUED | OUTPATIENT
Start: 2024-01-16 | End: 2024-01-22

## 2024-01-16 RX ORDER — FLUTICASONE PROPIONATE 50 MCG
1 SPRAY, SUSPENSION NASAL
Refills: 0 | Status: DISCONTINUED | OUTPATIENT
Start: 2024-01-16 | End: 2024-01-22

## 2024-01-16 RX ADMIN — Medication 1 SPRAY(S): at 20:26

## 2024-01-16 RX ADMIN — RISPERIDONE 1 MILLIGRAM(S): 4 TABLET ORAL at 20:24

## 2024-01-16 RX ADMIN — Medication 650 MILLIGRAM(S): at 16:32

## 2024-01-16 RX ADMIN — RISPERIDONE 1 MILLIGRAM(S): 4 TABLET ORAL at 09:22

## 2024-01-16 RX ADMIN — Medication 100 MILLIGRAM(S): at 20:25

## 2024-01-16 RX ADMIN — Medication 5 MILLIGRAM(S): at 20:25

## 2024-01-16 RX ADMIN — Medication 650 MILLIGRAM(S): at 17:32

## 2024-01-16 RX ADMIN — BENZOCAINE AND MENTHOL 1 LOZENGE: 5; 1 LIQUID ORAL at 16:32

## 2024-01-16 NOTE — BH INPATIENT PSYCHIATRY PROGRESS NOTE - NSBHASSESSSUMMFT_PSY_ALL_CORE
Low risk as the pt with return of sleep ,but still maintains that she is not intending to return to live with her .   mitigating pt on medication   protective has social support   chronic pt with prior psych hx
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
## 2024-01-17 PROCEDURE — 99232 SBSQ HOSP IP/OBS MODERATE 35: CPT

## 2024-01-17 RX ADMIN — Medication 1 SPRAY(S): at 09:55

## 2024-01-17 RX ADMIN — QUETIAPINE FUMARATE 50 MILLIGRAM(S): 200 TABLET, FILM COATED ORAL at 20:47

## 2024-01-17 RX ADMIN — Medication 100 MILLIGRAM(S): at 20:47

## 2024-01-17 RX ADMIN — RISPERIDONE 1 MILLIGRAM(S): 4 TABLET ORAL at 09:56

## 2024-01-17 RX ADMIN — RISPERIDONE 1 MILLIGRAM(S): 4 TABLET ORAL at 20:47

## 2024-01-17 RX ADMIN — Medication 5 MILLIGRAM(S): at 20:47

## 2024-01-17 RX ADMIN — BENZOCAINE AND MENTHOL 1 LOZENGE: 5; 1 LIQUID ORAL at 09:55

## 2024-01-17 RX ADMIN — Medication 1 SPRAY(S): at 20:52

## 2024-01-17 NOTE — BH DISCHARGE NOTE NURSING/SOCIAL WORK/PSYCH REHAB - NSCDUDCCRISIS_PSY_A_CORE
.  [facility] Crisis Care for Children, Adults and Families  [address] [place] [address]  Mobile Crisis Hotline – (432) 181-6569/.National Suicide Prevention Lifeline 3 (892) 228-3711/.  [facility]  [phone] 614-1874/.  [facility] Response Crisis Hotline  (129) 744-3639  24 hour telephone crisis intervention and suicide prevention hotline concerned with all mental health issues/Other... .  [facility] Crisis Care for Children, Adults and Families  [address] [place] [address]  Mobile Crisis Hotline – (448) 751-7273/.  [facility]  (139) 230-2584/.  [facility] Response Crisis Hotline  (785) 104-2131  24 hour telephone crisis intervention and suicide prevention hotline concerned with all mental health issues/.  [facility]  [address] [place] [phone]   Hours:  Monday through Friday from 9 AM to 3 PM/Other.../988 Suicide and Crisis Lifeline

## 2024-01-17 NOTE — BH DISCHARGE NOTE NURSING/SOCIAL WORK/PSYCH REHAB - NSBHCRISISRESOURCESOTHER_PSY_ALL_CORE_FT
Patient has agreed to receive a copy of their discharge note in the patient portal.  If needed, please contact [facility], 5N, 24/7 days a week and speak with Lucila Walton RN Nurse manager or any 5N staff member at [phone]. Please return to the ED if symptoms worsen or return.

## 2024-01-17 NOTE — BH DISCHARGE NOTE NURSING/SOCIAL WORK/PSYCH REHAB - NSDCNEXTLEVELSWNAME_PSY_ALL_CORE_FT
Nesha Cai, [facility] Nesha Cai, [facility] [phone]

## 2024-01-17 NOTE — BH DISCHARGE NOTE NURSING/SOCIAL WORK/PSYCH REHAB - PATIENT PORTAL LINK FT
You can access the FollowMyHealth Patient Portal offered by [facility] by registering at the following website: [email or web address]. By joining [facility]’s FollowMyHealth portal, you will also be able to view your health information using other applications (apps) compatible with our system.

## 2024-01-17 NOTE — BH DISCHARGE NOTE NURSING/SOCIAL WORK/PSYCH REHAB - NSDCPRGOAL_PSY_ALL_CORE
Pt is scheduled for discharge on 1.22.24. She has made progress towards goals and has attended group sessions.

## 2024-01-17 NOTE — BH DISCHARGE NOTE NURSING/SOCIAL WORK/PSYCH REHAB - NSDPDISTO_PSY_ALL_CORE
Pt. will be discharged to her friend Melody Alvarenga's address: [address] [place] [address]  ph. [phone]    Pt. requests to be called on her friend's phone above. Pt. friend Melody aware and agrees to this plan./Home

## 2024-01-17 NOTE — BH DISCHARGE NOTE NURSING/SOCIAL WORK/PSYCH REHAB - NSDCPRRECOMMEND_PSY_ALL_CORE
Pt will be encouraged to follow discharge plan and continued to use effective coping skills to manage her symptoms

## 2024-01-17 NOTE — BH DISCHARGE NOTE NURSING/SOCIAL WORK/PSYCH REHAB - NSDCADDINFO1FT_PSY_ALL_CORE
You will have an appointment at [facility] on  Please contact Dr. Smith for medication or treatment questions, lab results or any other concerns at [phone]. Handoff provided to your outpatient provider, [facility].    Please return to the ED if symptoms worsen or return.   You will have a TELEHEALTH appointment at [facility] on Wednesday, Jan. 24th 2024 at 3pm w/ Megan Carranza. As discussed, you may complete the telehealth appointment at the [facility] office. Please arrive 15 minutes early from your appointment to complete paperwork and set up the telehealth appointment.     Pt. denies any substance abuse needs. If any should arise, please discuss in treatment with the [facility].    Please contact Dr. Smith for medication or treatment questions, lab results or any other concerns at [phone]. Handoff provided to your outpatient provider, [facility]. Please return to the ED if symptoms worsen or return.

## 2024-01-17 NOTE — BH DISCHARGE NOTE NURSING/SOCIAL WORK/PSYCH REHAB - NSDCCRNAME_GEN_ALL_CORE_FT
----- Message from Juliana Márquez MD sent at 12/29/2023  2:48 PM CST -----  Can we please get Ms. Mcghee set up with a visit with Verna to discuss osteoporosis treatment.   Thank you!      1/4/24 @ 1429 Spoke with pt scheduled for Tues 1/9 at 1200. Explained to pt how to use the portal for the visit. Pt states she has had trouble in the past. Will leave a note in the comments.      *Please see discharge packet for additional resources discussed

## 2024-01-17 NOTE — BH INPATIENT PSYCHIATRY PROGRESS NOTE - NSBHASSESSSUMMFT_PSY_ALL_CORE
low risk for suicide as the pt indicated that she is not willing to return to be living with her  .  Pt planning to be living with her femae friend .   mitigating pt willing to remain on medication   protective pt with social support   chronic pt who prior psych hosp
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
## 2024-01-18 PROCEDURE — 99232 SBSQ HOSP IP/OBS MODERATE 35: CPT

## 2024-01-18 RX ADMIN — Medication 5 MILLIGRAM(S): at 20:44

## 2024-01-18 RX ADMIN — RISPERIDONE 1 MILLIGRAM(S): 4 TABLET ORAL at 20:44

## 2024-01-18 RX ADMIN — Medication 100 MILLIGRAM(S): at 20:44

## 2024-01-18 RX ADMIN — Medication 1 SPRAY(S): at 20:45

## 2024-01-18 RX ADMIN — Medication 1 SPRAY(S): at 09:26

## 2024-01-18 RX ADMIN — RISPERIDONE 1 MILLIGRAM(S): 4 TABLET ORAL at 09:26

## 2024-01-18 NOTE — BH INPATIENT PSYCHIATRY PROGRESS NOTE - NSBHMSEAFFQUAL_PSY_A_CORE
Group Topic: BH Process Group     Date: February 20  Start Time: 10:00 AM  End Time: 12:00 PM    Focus: Homework review/Check-in    Group focused on recovery education, coping skills, 12 steps, consequences, and treatment using a Sovi game.     Number in attendance: 6 (1 residential)      Attendance: Present  Response Communication: Appropriate topic  MoodAffect: Appropriate to group  Behavior/Socialization: Appropriate to group  Thought Process: Appropriate  Patient Response: Attentive, Good eye contact and Interactive     Patient reported their last use was on 2/11/19 of \"cociane.\" Patient reported sleeping 5 hours last night. Patient has difficulty falling asleep, and staying asleep. Patient rated appetite as good and ate \"3\" meals within the last 24 hours. Patient rated depression at a 5/10 (10 being the worst) today. Patient has anxiety due to \"my future sobriety an court cases.\" Patient rated cravings at a 5/10 (10 being the worst) today. Patient attended 0 recovery meetings within the last week. Patient states he is feeling hopeless. Patient denied any SI, HI, AVH. The patient identified a recovery goal for the week. The goal is \"I would like to make sure I make it to the gym after missing the last 2 days.\"      Euthymic/Anxious
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
## 2024-01-18 NOTE — BH INPATIENT PSYCHIATRY PROGRESS NOTE - NSBHASSESSSUMMFT_PSY_ALL_CORE
low risk for suicide as the pt with plans to live with friend and then located new place to live . Pt not planning to return to live with  mitigating pt remains on some medication   protective pt with social support   chronic pt with prior psych hx

## 2024-01-19 PROCEDURE — 99232 SBSQ HOSP IP/OBS MODERATE 35: CPT

## 2024-01-19 RX ADMIN — RISPERIDONE 1 MILLIGRAM(S): 4 TABLET ORAL at 21:12

## 2024-01-19 RX ADMIN — Medication 1 SPRAY(S): at 21:12

## 2024-01-19 RX ADMIN — Medication 5 MILLIGRAM(S): at 21:11

## 2024-01-19 RX ADMIN — RISPERIDONE 1 MILLIGRAM(S): 4 TABLET ORAL at 10:10

## 2024-01-19 RX ADMIN — Medication 1 SPRAY(S): at 10:10

## 2024-01-19 RX ADMIN — Medication 100 MILLIGRAM(S): at 21:11

## 2024-01-19 NOTE — BH INPATIENT PSYCHIATRY PROGRESS NOTE - NSBHASSESSSUMMFT_PSY_ALL_CORE
low risk for suicide as the pt is not planning to return to live with her  and is staying with a friend who seems to ne willing to be her support.  Pt expressed that she is feeling calm and comfortable about her plan   mitigating pt remains on medication   protective pt with the support of her friend  chronic pt with marital problems

## 2024-01-19 NOTE — BH INPATIENT PSYCHIATRY PROGRESS NOTE - NSTXPSYCHODATEEST_PSY_ALL_CORE
01-Jan-2024

## 2024-01-19 NOTE — BH INPATIENT PSYCHIATRY PROGRESS NOTE - NSTXCOPEDATEEST_PSY_ALL_CORE
08-Jan-2024
01-Jan-2024
14-Jan-2024
01-Jan-2024
14-Jan-2024
08-Jan-2024
01-Jan-2024
01-Jan-2024

## 2024-01-19 NOTE — BH INPATIENT PSYCHIATRY PROGRESS NOTE - NSBHCONSDANGERSELF_PSY_A_CORE
unable to care for self
suicidal ideation with plan and means

## 2024-01-19 NOTE — BH INPATIENT PSYCHIATRY PROGRESS NOTE - NSBHCHARTREVIEWVS_PSY_A_CORE FT
Vital Signs Last 24 Hrs  T(C): 36.2 (01-19-24 @ 07:26), Max: 36.2 (01-19-24 @ 07:26)  T(F): 97.2 (01-19-24 @ 07:26), Max: 97.2 (01-19-24 @ 07:26)  HR: --  BP: --  BP(mean): --  RR: 18 (01-19-24 @ 07:26) (18 - 18)  SpO2: 100% (01-19-24 @ 07:26) (100% - 100%)    Orthostatic VS  01-19-24 @ 07:26  Lying BP: --/-- HR: --  Sitting BP: 126/60 HR: 101  Standing BP: 114/70 HR: 96  Site: upper right arm  Mode: electronic  Orthostatic VS  01-18-24 @ 08:58  Lying BP: --/-- HR: --  Sitting BP: 134/71 HR: 88  Standing BP: 119/69 HR: 100  Site: upper right arm  Mode: electronic  
Vital Signs Last 24 Hrs  T(C): 35.6 (01-18-24 @ 08:58), Max: 35.6 (01-18-24 @ 08:58)  T(F): 96.1 (01-18-24 @ 08:58), Max: 96.1 (01-18-24 @ 08:58)  HR: --  BP: --  BP(mean): --  RR: 16 (01-18-24 @ 08:58) (16 - 16)  SpO2: 100% (01-18-24 @ 08:58) (100% - 100%)    Orthostatic VS  01-18-24 @ 08:58  Lying BP: --/-- HR: --  Sitting BP: 134/71 HR: 88  Standing BP: 119/69 HR: 100  Site: upper right arm  Mode: electronic  Orthostatic VS  01-17-24 @ 07:26  Lying BP: --/-- HR: --  Sitting BP: 134/78 HR: 99  Standing BP: 118/70 HR: 103  Site: upper right arm  Mode: electronic  
Vital Signs Last 24 Hrs  T(C): 36.4 (01-03-24 @ 07:44), Max: 36.4 (01-03-24 @ 07:44)  T(F): 97.6 (01-03-24 @ 07:44), Max: 97.6 (01-03-24 @ 07:44)  HR: --  BP: --  BP(mean): --  RR: 16 (01-03-24 @ 07:44) (16 - 16)  SpO2: 98% (01-03-24 @ 07:44) (98% - 98%)    Orthostatic VS  01-03-24 @ 07:44  Lying BP: --/-- HR: --  Sitting BP: 134/71 HR: 90  Standing BP: 133/87 HR: 99  Site: upper right arm  Mode: electronic  Orthostatic VS  01-02-24 @ 08:10  Lying BP: --/-- HR: --  Sitting BP: 129/81 HR: 96  Standing BP: 129/83 HR: 102  Site: upper right arm  Mode: electronic  
Vital Signs Last 24 Hrs  T(C): 36.7 (01-16-24 @ 07:32), Max: 36.7 (01-16-24 @ 07:32)  T(F): 98 (01-16-24 @ 07:32), Max: 98 (01-16-24 @ 07:32)  HR: --  BP: --  BP(mean): --  RR: 16 (01-16-24 @ 07:32) (16 - 16)  SpO2: 98% (01-16-24 @ 07:32) (98% - 98%)    Orthostatic VS  01-16-24 @ 07:32  Lying BP: --/-- HR: --  Sitting BP: 128/81 HR: 102  Standing BP: 115/60 HR: 102  Site: upper right arm  Mode: electronic  Orthostatic VS  01-15-24 @ 08:09  Lying BP: --/-- HR: --  Sitting BP: 126/81 HR: 99  Standing BP: 113/89 HR: 107  Site: upper right arm  Mode: electronic  
Vital Signs Last 24 Hrs  T(C): 36.9 (01-17-24 @ 07:26), Max: 36.9 (01-17-24 @ 07:26)  T(F): 98.4 (01-17-24 @ 07:26), Max: 98.4 (01-17-24 @ 07:26)  HR: --  BP: --  BP(mean): --  RR: 18 (01-17-24 @ 07:26) (18 - 18)  SpO2: 96% (01-17-24 @ 07:26) (96% - 96%)    Orthostatic VS  01-17-24 @ 07:26  Lying BP: --/-- HR: --  Sitting BP: 134/78 HR: 99  Standing BP: 118/70 HR: 103  Site: upper right arm  Mode: electronic  Orthostatic VS  01-16-24 @ 07:32  Lying BP: --/-- HR: --  Sitting BP: 128/81 HR: 102  Standing BP: 115/60 HR: 102  Site: upper right arm  Mode: electronic  
Vital Signs Last 24 Hrs  T(C): 36.4 (01-13-24 @ 07:19), Max: 36.4 (01-13-24 @ 07:19)  T(F): 97.5 (01-13-24 @ 07:19), Max: 97.5 (01-13-24 @ 07:19)  HR: --  BP: --  BP(mean): --  RR: 16 (01-13-24 @ 07:19) (16 - 16)  SpO2: 95% (01-13-24 @ 07:19) (95% - 95%)    Orthostatic VS  01-13-24 @ 07:19  Lying BP: --/-- HR: --  Sitting BP: 138/82 HR: 108  Standing BP: 112/64 HR: 105  Site: upper right arm  Mode: electronic  Orthostatic VS  01-12-24 @ 07:22  Lying BP: --/-- HR: --  Sitting BP: 132/84 HR: 99  Standing BP: 127/71 HR: 102  Site: upper right arm  Mode: electronic  
Vital Signs Last 24 Hrs  T(C): 36.4 (01-11-24 @ 07:02), Max: 36.4 (01-11-24 @ 07:02)  T(F): 97.5 (01-11-24 @ 07:02), Max: 97.5 (01-11-24 @ 07:02)  HR: --  BP: --  BP(mean): --  RR: 18 (01-11-24 @ 07:02) (18 - 18)  SpO2: 96% (01-11-24 @ 07:02) (96% - 96%)    Orthostatic VS  01-11-24 @ 07:02  Lying BP: --/-- HR: --  Sitting BP: 137/83 HR: 103  Standing BP: 121/76 HR: 106  Site: upper right arm  Mode: electronic  Orthostatic VS  01-10-24 @ 08:17  Lying BP: --/-- HR: --  Sitting BP: 131/83 HR: 102  Standing BP: 114/73 HR: 106  Site: upper right arm  Mode: electronic  
Vital Signs Last 24 Hrs  T(C): 36.8 (01-08-24 @ 07:34), Max: 36.8 (01-08-24 @ 07:34)  T(F): 98.2 (01-08-24 @ 07:34), Max: 98.2 (01-08-24 @ 07:34)  HR: --  BP: --  BP(mean): --  RR: 16 (01-08-24 @ 07:34) (16 - 16)  SpO2: 98% (01-08-24 @ 07:34) (98% - 98%)    Orthostatic VS  01-08-24 @ 07:34  Lying BP: --/-- HR: --  Sitting BP: 145/86 HR: 100  Standing BP: 132/85 HR: 102  Site: upper right arm  Mode: electronic  Orthostatic VS  01-07-24 @ 08:13  Lying BP: --/-- HR: --  Sitting BP: 148/80 HR: 102  Standing BP: 139/92 HR: 104  Site: upper right arm  Mode: electronic  
Vital Signs Last 24 Hrs  T(C): 36.6 (01-10-24 @ 08:17), Max: 36.6 (01-10-24 @ 08:17)  T(F): 97.9 (01-10-24 @ 08:17), Max: 97.9 (01-10-24 @ 08:17)  HR: --  BP: --  BP(mean): --  RR: 17 (01-10-24 @ 08:17) (17 - 17)  SpO2: 96% (01-10-24 @ 08:17) (96% - 96%)    Orthostatic VS  01-10-24 @ 08:17  Lying BP: --/-- HR: --  Sitting BP: 131/83 HR: 102  Standing BP: 114/73 HR: 106  Site: upper right arm  Mode: electronic  Orthostatic VS  01-09-24 @ 08:06  Lying BP: --/-- HR: --  Sitting BP: 119/74 HR: 100  Standing BP: 112/71 HR: 100  Site: upper right arm  Mode: electronic  
Vital Signs Last 24 Hrs  T(C): 36.3 (01-05-24 @ 08:12), Max: 36.3 (01-05-24 @ 08:12)  T(F): 97.4 (01-05-24 @ 08:12), Max: 97.4 (01-05-24 @ 08:12)  HR: --  BP: --  BP(mean): --  RR: 16 (01-05-24 @ 08:12) (16 - 16)  SpO2: 100% (01-05-24 @ 08:12) (100% - 100%)    Orthostatic VS  01-05-24 @ 08:12  Lying BP: --/-- HR: --  Sitting BP: 136/86 HR: 100  Standing BP: 125/85 HR: 100  Site: upper right arm  Mode: electronic  Orthostatic VS  01-04-24 @ 07:16  Lying BP: --/-- HR: --  Sitting BP: 135/72 HR: 98  Standing BP: 120/74 HR: 109  Site: upper right arm  Mode: electronic  
Vital Signs Last 24 Hrs  T(C): 36.8 (01-04-24 @ 07:16), Max: 36.8 (01-04-24 @ 07:16)  T(F): 98.2 (01-04-24 @ 07:16), Max: 98.2 (01-04-24 @ 07:16)  HR: --  BP: --  BP(mean): --  RR: 18 (01-04-24 @ 07:16) (18 - 18)  SpO2: 98% (01-04-24 @ 07:16) (98% - 98%)    Orthostatic VS  01-04-24 @ 07:16  Lying BP: --/-- HR: --  Sitting BP: 135/72 HR: 98  Standing BP: 120/74 HR: 109  Site: upper right arm  Mode: electronic  Orthostatic VS  01-03-24 @ 07:44  Lying BP: --/-- HR: --  Sitting BP: 134/71 HR: 90  Standing BP: 133/87 HR: 99  Site: upper right arm  Mode: electronic  
Vital Signs Last 24 Hrs  T(C): 36.4 (01-02-24 @ 08:10), Max: 36.4 (01-02-24 @ 08:10)  T(F): 97.5 (01-02-24 @ 08:10), Max: 97.5 (01-02-24 @ 08:10)  HR: --  BP: --  BP(mean): --  RR: 16 (01-02-24 @ 08:10) (16 - 16)  SpO2: 98% (01-02-24 @ 08:10) (98% - 98%)    Orthostatic VS  01-02-24 @ 08:10  Lying BP: --/-- HR: --  Sitting BP: 129/81 HR: 96  Standing BP: 129/83 HR: 102  Site: upper right arm  Mode: electronic  Orthostatic VS  01-01-24 @ 06:53  Lying BP: --/-- HR: --  Sitting BP: 155/76 HR: 97  Standing BP: 142/84 HR: 103  Site: upper right arm  Mode: electronic  
Vital Signs Last 24 Hrs  T(C): 36.9 (01-09-24 @ 08:06), Max: 36.9 (01-09-24 @ 08:06)  T(F): 98.4 (01-09-24 @ 08:06), Max: 98.4 (01-09-24 @ 08:06)  HR: --  BP: --  BP(mean): --  RR: 16 (01-09-24 @ 08:06) (16 - 16)  SpO2: 100% (01-09-24 @ 08:06) (100% - 100%)    Orthostatic VS  01-09-24 @ 08:06  Lying BP: --/-- HR: --  Sitting BP: 119/74 HR: 100  Standing BP: 112/71 HR: 100  Site: upper right arm  Mode: electronic  Orthostatic VS  01-08-24 @ 07:34  Lying BP: --/-- HR: --  Sitting BP: 145/86 HR: 100  Standing BP: 132/85 HR: 102  Site: upper right arm  Mode: electronic  
Vital Signs Last 24 Hrs  T(C): 36.2 (01-12-24 @ 07:22), Max: 36.2 (01-12-24 @ 07:22)  T(F): 97.2 (01-12-24 @ 07:22), Max: 97.2 (01-12-24 @ 07:22)  HR: --  BP: --  BP(mean): --  RR: 18 (01-12-24 @ 07:22) (18 - 18)  SpO2: 99% (01-12-24 @ 07:22) (99% - 99%)    Orthostatic VS  01-12-24 @ 07:22  Lying BP: --/-- HR: --  Sitting BP: 132/84 HR: 99  Standing BP: 127/71 HR: 102  Site: upper right arm  Mode: electronic  Orthostatic VS  01-11-24 @ 07:02  Lying BP: --/-- HR: --  Sitting BP: 137/83 HR: 103  Standing BP: 121/76 HR: 106  Site: upper right arm  Mode: electronic

## 2024-01-19 NOTE — BH INPATIENT PSYCHIATRY PROGRESS NOTE - NSTXDCOTHRPROGRES_PSY_ALL_CORE
Improving
Improving
No Change
Improving
No Change

## 2024-01-19 NOTE — BH INPATIENT PSYCHIATRY PROGRESS NOTE - NSBHFUPINTERVALHXFT_PSY_A_CORE
1/19/2024 Pt with continued concrete thoughts and remains still with thoughts to live with her friend. Pt with refusal to allow for any contact with any other person aside from her one friend. Pt denies any suicidal ideation intent or plan   1/18/2024 Contacted the pt's friend to help arrange for the aftercare planning as pt intending to be living with the friend for awhile.  Pt with continued concrete thoughts , blunted constricted affect    "I really would like to be able to be discharged soon"  1/17/2024 Pt still with resp infection and is isolating.  Pt aftercare plan involves the availability for her friend to house her and the CSW wasinitially with difficulty in contacting that person.  Pt with continued gaps in her plan and CSW attempting to obtain any much community support for her. Pt denies any suicidal or homicidal ideation intent or plan

## 2024-01-19 NOTE — BH INPATIENT PSYCHIATRY PROGRESS NOTE - NSTXDCOTHRDATETRGT_PSY_ALL_CORE
24-Jan-2024
08-Jan-2024
08-Jan-2024
19-Jan-2024
08-Jan-2024
24-Jan-2024
19-Jan-2024
08-Jan-2024
24-Jan-2024
22-Jan-2024
08-Jan-2024

## 2024-01-19 NOTE — BH INPATIENT PSYCHIATRY PROGRESS NOTE - NSTXANXDATEEST_PSY_ALL_CORE
31-Dec-2023
30-Dec-2023
31-Dec-2023
30-Dec-2023
31-Dec-2023
30-Dec-2023
30-Dec-2023
31-Dec-2023

## 2024-01-19 NOTE — BH INPATIENT PSYCHIATRY PROGRESS NOTE - NSBHFUPINTERVALCCFT_PSY_A_CORE
"I feel safe here and would like help in getting my aftercare but I don't intend to return to live with my  its not good for me to return there." 
"I'm still isolating "
"I continue to feel that I am making the right decision so I'm good with it"
"I really would like to be able to be discharged soon"
"I feel safe here and would like help in getting my aftercare but I don't intend to return to live with my  its not good for me to return there." 
"I think its time I live away from my  "
"I'm wondering when I would get some idea about a safe house referral 
" I'm feeling a little better now "
" I'm feeling a little better now "
"I need a safe house "
" I'm feeling a little better now "
"I'm still interested in finding another place to live"
"I really don't know if its a good idea to stay here"
" I'm feeling a little better now "

## 2024-01-19 NOTE — BH INPATIENT PSYCHIATRY PROGRESS NOTE - NSTXDCOTHRDATEEST_PSY_ALL_CORE
12-Jan-2024
17-Jan-2024
01-Jan-2024
17-Jan-2024
01-Jan-2024
01-Jan-2024
17-Jan-2024
16-Jan-2024
01-Jan-2024
12-Jan-2024

## 2024-01-19 NOTE — BH INPATIENT PSYCHIATRY PROGRESS NOTE - NSTXANXPROGRES_PSY_ALL_CORE
Improving
No Change
Improving
No Change
No Change
Improving
No Change

## 2024-01-19 NOTE — BH INPATIENT PSYCHIATRY PROGRESS NOTE - NSTXPSYCHOGOAL_PSY_ALL_CORE
Will engage in a 15 minute conversation with no irrational content

## 2024-01-19 NOTE — BH INPATIENT PSYCHIATRY PROGRESS NOTE - NSBHMSEMUSCLE_PSY_A_CORE
Normal muscle tone/strength
Unable to assess
Normal muscle tone/strength

## 2024-01-19 NOTE — BH INPATIENT PSYCHIATRY PROGRESS NOTE - NSBHMSEAFFRANGE_PSY_A_CORE
Blunted/Constricted

## 2024-01-19 NOTE — BH INPATIENT PSYCHIATRY PROGRESS NOTE - NSDCCRITERIA_PSY_ALL_CORE
Pt to have euthymic mood , denies any suicidal ideation, no delusions 

## 2024-01-19 NOTE — BH INPATIENT PSYCHIATRY PROGRESS NOTE - NSBHMETABOLIC_PSY_ALL_CORE_FT
BMI: BMI (kg/m2): 33.5 (12-31-23 @ 14:39)  HbA1c: A1C with Estimated Average Glucose Result: 5.2 % (01-01-24 @ 09:06)    Glucose:   BP: --Vital Signs Last 24 Hrs  T(C): 36.2 (01-19-24 @ 07:26), Max: 36.2 (01-19-24 @ 07:26)  T(F): 97.2 (01-19-24 @ 07:26), Max: 97.2 (01-19-24 @ 07:26)  HR: --  BP: --  BP(mean): --  RR: 18 (01-19-24 @ 07:26) (18 - 18)  SpO2: 100% (01-19-24 @ 07:26) (100% - 100%)    Orthostatic VS  01-19-24 @ 07:26  Lying BP: --/-- HR: --  Sitting BP: 126/60 HR: 101  Standing BP: 114/70 HR: 96  Site: upper right arm  Mode: electronic  Orthostatic VS  01-18-24 @ 08:58  Lying BP: --/-- HR: --  Sitting BP: 134/71 HR: 88  Standing BP: 119/69 HR: 100  Site: upper right arm  Mode: electronic    Lipid Panel: Date/Time: 01-01-24 @ 09:06  Cholesterol, Serum: 182  LDL Cholesterol Calculated: 80  HDL Cholesterol, Serum: 88  Total Cholesterol/HDL Ration Measurement: --  Triglycerides, Serum: 81  
BMI: BMI (kg/m2): 33.5 (12-31-23 @ 14:39)  HbA1c: A1C with Estimated Average Glucose Result: 5.2 % (01-01-24 @ 09:06)    Glucose:   BP: --Vital Signs Last 24 Hrs  T(C): 36.9 (01-17-24 @ 07:26), Max: 36.9 (01-17-24 @ 07:26)  T(F): 98.4 (01-17-24 @ 07:26), Max: 98.4 (01-17-24 @ 07:26)  HR: --  BP: --  BP(mean): --  RR: 18 (01-17-24 @ 07:26) (18 - 18)  SpO2: 96% (01-17-24 @ 07:26) (96% - 96%)    Orthostatic VS  01-17-24 @ 07:26  Lying BP: --/-- HR: --  Sitting BP: 134/78 HR: 99  Standing BP: 118/70 HR: 103  Site: upper right arm  Mode: electronic  Orthostatic VS  01-16-24 @ 07:32  Lying BP: --/-- HR: --  Sitting BP: 128/81 HR: 102  Standing BP: 115/60 HR: 102  Site: upper right arm  Mode: electronic    Lipid Panel: Date/Time: 01-01-24 @ 09:06  Cholesterol, Serum: 182  LDL Cholesterol Calculated: 80  HDL Cholesterol, Serum: 88  Total Cholesterol/HDL Ration Measurement: --  Triglycerides, Serum: 81  
BMI: BMI (kg/m2): 33.5 (12-31-23 @ 14:39)  HbA1c: A1C with Estimated Average Glucose Result: 5.2 % (01-01-24 @ 09:06)    Glucose:   BP: --Vital Signs Last 24 Hrs  T(C): 35.6 (01-18-24 @ 08:58), Max: 35.6 (01-18-24 @ 08:58)  T(F): 96.1 (01-18-24 @ 08:58), Max: 96.1 (01-18-24 @ 08:58)  HR: --  BP: --  BP(mean): --  RR: 16 (01-18-24 @ 08:58) (16 - 16)  SpO2: 100% (01-18-24 @ 08:58) (100% - 100%)    Orthostatic VS  01-18-24 @ 08:58  Lying BP: --/-- HR: --  Sitting BP: 134/71 HR: 88  Standing BP: 119/69 HR: 100  Site: upper right arm  Mode: electronic  Orthostatic VS  01-17-24 @ 07:26  Lying BP: --/-- HR: --  Sitting BP: 134/78 HR: 99  Standing BP: 118/70 HR: 103  Site: upper right arm  Mode: electronic    Lipid Panel: Date/Time: 01-01-24 @ 09:06  Cholesterol, Serum: 182  LDL Cholesterol Calculated: 80  HDL Cholesterol, Serum: 88  Total Cholesterol/HDL Ration Measurement: --  Triglycerides, Serum: 81  
BMI: BMI (kg/m2): 33.5 (12-31-23 @ 14:39)  HbA1c: A1C with Estimated Average Glucose Result: 5.2 % (01-01-24 @ 09:06)  TSH-2.81    Glucose:   BP: 146/87 (12-31-23 @ 13:36) (130/73 - 146/87)Vital Signs Last 24 Hrs  T(C): 36.4 (01-02-24 @ 08:10), Max: 36.4 (01-02-24 @ 08:10)  T(F): 97.5 (01-02-24 @ 08:10), Max: 97.5 (01-02-24 @ 08:10)  HR: --  BP: --  BP(mean): --  RR: 16 (01-02-24 @ 08:10) (16 - 16)  SpO2: 98% (01-02-24 @ 08:10) (98% - 98%)    Orthostatic VS  01-02-24 @ 08:10  Lying BP: --/-- HR: --  Sitting BP: 129/81 HR: 96  Standing BP: 129/83 HR: 102  Site: upper right arm  Mode: electronic  Orthostatic VS  01-01-24 @ 06:53  Lying BP: --/-- HR: --  Sitting BP: 155/76 HR: 97  Standing BP: 142/84 HR: 103  Site: upper right arm  Mode: electronic    Lipid Panel: Date/Time: 01-01-24 @ 09:06  Cholesterol, Serum: 182  LDL Cholesterol Calculated: 80  HDL Cholesterol, Serum: 88  Total Cholesterol/HDL Ration Measurement: --  Triglycerides, Serum: 81  
BMI: BMI (kg/m2): 33.5 (12-31-23 @ 14:39)  HbA1c: A1C with Estimated Average Glucose Result: 5.2 % (01-01-24 @ 09:06)    Glucose:   BP: --Vital Signs Last 24 Hrs  T(C): 36.2 (01-12-24 @ 07:22), Max: 36.2 (01-12-24 @ 07:22)  T(F): 97.2 (01-12-24 @ 07:22), Max: 97.2 (01-12-24 @ 07:22)  HR: --  BP: --  BP(mean): --  RR: 18 (01-12-24 @ 07:22) (18 - 18)  SpO2: 99% (01-12-24 @ 07:22) (99% - 99%)    Orthostatic VS  01-12-24 @ 07:22  Lying BP: --/-- HR: --  Sitting BP: 132/84 HR: 99  Standing BP: 127/71 HR: 102  Site: upper right arm  Mode: electronic  Orthostatic VS  01-11-24 @ 07:02  Lying BP: --/-- HR: --  Sitting BP: 137/83 HR: 103  Standing BP: 121/76 HR: 106  Site: upper right arm  Mode: electronic    Lipid Panel: Date/Time: 01-01-24 @ 09:06  Cholesterol, Serum: 182  LDL Cholesterol Calculated: 80  HDL Cholesterol, Serum: 88  Total Cholesterol/HDL Ration Measurement: --  Triglycerides, Serum: 81  
BMI: BMI (kg/m2): 33.5 (12-31-23 @ 14:39)  HbA1c: A1C with Estimated Average Glucose Result: 5.2 % (01-01-24 @ 09:06)    Glucose:   BP: --Vital Signs Last 24 Hrs  T(C): 36.4 (01-13-24 @ 07:19), Max: 36.4 (01-13-24 @ 07:19)  T(F): 97.5 (01-13-24 @ 07:19), Max: 97.5 (01-13-24 @ 07:19)  HR: --  BP: --  BP(mean): --  RR: 16 (01-13-24 @ 07:19) (16 - 16)  SpO2: 95% (01-13-24 @ 07:19) (95% - 95%)    Orthostatic VS  01-13-24 @ 07:19  Lying BP: --/-- HR: --  Sitting BP: 138/82 HR: 108  Standing BP: 112/64 HR: 105  Site: upper right arm  Mode: electronic  Orthostatic VS  01-12-24 @ 07:22  Lying BP: --/-- HR: --  Sitting BP: 132/84 HR: 99  Standing BP: 127/71 HR: 102  Site: upper right arm  Mode: electronic    Lipid Panel: Date/Time: 01-01-24 @ 09:06  Cholesterol, Serum: 182  LDL Cholesterol Calculated: 80  HDL Cholesterol, Serum: 88  Total Cholesterol/HDL Ration Measurement: --  Triglycerides, Serum: 81  
BMI: BMI (kg/m2): 33.5 (12-31-23 @ 14:39)  HbA1c: A1C with Estimated Average Glucose Result: 5.2 % (01-01-24 @ 09:06)    Glucose:   BP: --Vital Signs Last 24 Hrs  T(C): 36.7 (01-16-24 @ 07:32), Max: 36.7 (01-16-24 @ 07:32)  T(F): 98 (01-16-24 @ 07:32), Max: 98 (01-16-24 @ 07:32)  HR: --  BP: --  BP(mean): --  RR: 16 (01-16-24 @ 07:32) (16 - 16)  SpO2: 98% (01-16-24 @ 07:32) (98% - 98%)    Orthostatic VS  01-16-24 @ 07:32  Lying BP: --/-- HR: --  Sitting BP: 128/81 HR: 102  Standing BP: 115/60 HR: 102  Site: upper right arm  Mode: electronic  Orthostatic VS  01-15-24 @ 08:09  Lying BP: --/-- HR: --  Sitting BP: 126/81 HR: 99  Standing BP: 113/89 HR: 107  Site: upper right arm  Mode: electronic    Lipid Panel: Date/Time: 01-01-24 @ 09:06  Cholesterol, Serum: 182  LDL Cholesterol Calculated: 80  HDL Cholesterol, Serum: 88  Total Cholesterol/HDL Ration Measurement: --  Triglycerides, Serum: 81  
BMI: BMI (kg/m2): 33.5 (12-31-23 @ 14:39)  HbA1c: A1C with Estimated Average Glucose Result: 5.2 % (01-01-24 @ 09:06)    Glucose:   BP: --Vital Signs Last 24 Hrs  T(C): 36.8 (01-08-24 @ 07:34), Max: 36.8 (01-08-24 @ 07:34)  T(F): 98.2 (01-08-24 @ 07:34), Max: 98.2 (01-08-24 @ 07:34)  HR: --  BP: --  BP(mean): --  RR: 16 (01-08-24 @ 07:34) (16 - 16)  SpO2: 98% (01-08-24 @ 07:34) (98% - 98%)    Orthostatic VS  01-08-24 @ 07:34  Lying BP: --/-- HR: --  Sitting BP: 145/86 HR: 100  Standing BP: 132/85 HR: 102  Site: upper right arm  Mode: electronic  Orthostatic VS  01-07-24 @ 08:13  Lying BP: --/-- HR: --  Sitting BP: 148/80 HR: 102  Standing BP: 139/92 HR: 104  Site: upper right arm  Mode: electronic    Lipid Panel: Date/Time: 01-01-24 @ 09:06  Cholesterol, Serum: 182  LDL Cholesterol Calculated: 80  HDL Cholesterol, Serum: 88  Total Cholesterol/HDL Ration Measurement: --  Triglycerides, Serum: 81  
BMI: BMI (kg/m2): 33.5 (12-31-23 @ 14:39)  HbA1c: A1C with Estimated Average Glucose Result: 5.2 % (01-01-24 @ 09:06)    Glucose:   BP: --Vital Signs Last 24 Hrs  T(C): 36.9 (01-09-24 @ 08:06), Max: 36.9 (01-09-24 @ 08:06)  T(F): 98.4 (01-09-24 @ 08:06), Max: 98.4 (01-09-24 @ 08:06)  HR: --  BP: --  BP(mean): --  RR: 16 (01-09-24 @ 08:06) (16 - 16)  SpO2: 100% (01-09-24 @ 08:06) (100% - 100%)    Orthostatic VS  01-09-24 @ 08:06  Lying BP: --/-- HR: --  Sitting BP: 119/74 HR: 100  Standing BP: 112/71 HR: 100  Site: upper right arm  Mode: electronic  Orthostatic VS  01-08-24 @ 07:34  Lying BP: --/-- HR: --  Sitting BP: 145/86 HR: 100  Standing BP: 132/85 HR: 102  Site: upper right arm  Mode: electronic    Lipid Panel: Date/Time: 01-01-24 @ 09:06  Cholesterol, Serum: 182  LDL Cholesterol Calculated: 80  HDL Cholesterol, Serum: 88  Total Cholesterol/HDL Ration Measurement: --  Triglycerides, Serum: 81  
BMI: BMI (kg/m2): 33.5 (12-31-23 @ 14:39)  HbA1c: A1C with Estimated Average Glucose Result: 5.2 % (01-01-24 @ 09:06)    Glucose:   BP: --Vital Signs Last 24 Hrs  T(C): 36.4 (01-11-24 @ 07:02), Max: 36.4 (01-11-24 @ 07:02)  T(F): 97.5 (01-11-24 @ 07:02), Max: 97.5 (01-11-24 @ 07:02)  HR: --  BP: --  BP(mean): --  RR: 18 (01-11-24 @ 07:02) (18 - 18)  SpO2: 96% (01-11-24 @ 07:02) (96% - 96%)    Orthostatic VS  01-11-24 @ 07:02  Lying BP: --/-- HR: --  Sitting BP: 137/83 HR: 103  Standing BP: 121/76 HR: 106  Site: upper right arm  Mode: electronic  Orthostatic VS  01-10-24 @ 08:17  Lying BP: --/-- HR: --  Sitting BP: 131/83 HR: 102  Standing BP: 114/73 HR: 106  Site: upper right arm  Mode: electronic    Lipid Panel: Date/Time: 01-01-24 @ 09:06  Cholesterol, Serum: 182  LDL Cholesterol Calculated: 80  HDL Cholesterol, Serum: 88  Total Cholesterol/HDL Ration Measurement: --  Triglycerides, Serum: 81  
BMI: BMI (kg/m2): 33.5 (12-31-23 @ 14:39)  HbA1c: A1C with Estimated Average Glucose Result: 5.2 % (01-01-24 @ 09:06)    Glucose:   BP: --Vital Signs Last 24 Hrs  T(C): 36.6 (01-10-24 @ 08:17), Max: 36.6 (01-10-24 @ 08:17)  T(F): 97.9 (01-10-24 @ 08:17), Max: 97.9 (01-10-24 @ 08:17)  HR: --  BP: --  BP(mean): --  RR: 17 (01-10-24 @ 08:17) (17 - 17)  SpO2: 96% (01-10-24 @ 08:17) (96% - 96%)    Orthostatic VS  01-10-24 @ 08:17  Lying BP: --/-- HR: --  Sitting BP: 131/83 HR: 102  Standing BP: 114/73 HR: 106  Site: upper right arm  Mode: electronic  Orthostatic VS  01-09-24 @ 08:06  Lying BP: --/-- HR: --  Sitting BP: 119/74 HR: 100  Standing BP: 112/71 HR: 100  Site: upper right arm  Mode: electronic    Lipid Panel: Date/Time: 01-01-24 @ 09:06  Cholesterol, Serum: 182  LDL Cholesterol Calculated: 80  HDL Cholesterol, Serum: 88  Total Cholesterol/HDL Ration Measurement: --  Triglycerides, Serum: 81  
BMI: BMI (kg/m2): 33.5 (12-31-23 @ 14:39)  HbA1c: A1C with Estimated Average Glucose Result: 5.2 % (01-01-24 @ 09:06)  TSH-2.81    Glucose:   BP: --Vital Signs Last 24 Hrs  T(C): 36.8 (01-04-24 @ 07:16), Max: 36.8 (01-04-24 @ 07:16)  T(F): 98.2 (01-04-24 @ 07:16), Max: 98.2 (01-04-24 @ 07:16)  HR: --  BP: --  BP(mean): --  RR: 18 (01-04-24 @ 07:16) (18 - 18)  SpO2: 98% (01-04-24 @ 07:16) (98% - 98%)    Orthostatic VS  01-04-24 @ 07:16  Lying BP: --/-- HR: --  Sitting BP: 135/72 HR: 98  Standing BP: 120/74 HR: 109  Site: upper right arm  Mode: electronic  Orthostatic VS  01-03-24 @ 07:44  Lying BP: --/-- HR: --  Sitting BP: 134/71 HR: 90  Standing BP: 133/87 HR: 99  Site: upper right arm  Mode: electronic    Lipid Panel: Date/Time: 01-01-24 @ 09:06  Cholesterol, Serum: 182  LDL Cholesterol Calculated: 80  HDL Cholesterol, Serum: 88  Total Cholesterol/HDL Ration Measurement: --  Triglycerides, Serum: 81  
BMI: BMI (kg/m2): 33.5 (12-31-23 @ 14:39)  HbA1c: A1C with Estimated Average Glucose Result: 5.2 % (01-01-24 @ 09:06)  TSH-2.81    Glucose:   BP: --Vital Signs Last 24 Hrs  T(C): 36.3 (01-05-24 @ 08:12), Max: 36.3 (01-05-24 @ 08:12)  T(F): 97.4 (01-05-24 @ 08:12), Max: 97.4 (01-05-24 @ 08:12)  HR: --  BP: --  BP(mean): --  RR: 16 (01-05-24 @ 08:12) (16 - 16)  SpO2: 100% (01-05-24 @ 08:12) (100% - 100%)    Orthostatic VS  01-05-24 @ 08:12  Lying BP: --/-- HR: --  Sitting BP: 136/86 HR: 100  Standing BP: 125/85 HR: 100  Site: upper right arm  Mode: electronic  Orthostatic VS  01-04-24 @ 07:16  Lying BP: --/-- HR: --  Sitting BP: 135/72 HR: 98  Standing BP: 120/74 HR: 109  Site: upper right arm  Mode: electronic    Lipid Panel: Date/Time: 01-01-24 @ 09:06  Cholesterol, Serum: 182  LDL Cholesterol Calculated: 80  HDL Cholesterol, Serum: 88  Total Cholesterol/HDL Ration Measurement: --  Triglycerides, Serum: 81  
BMI: BMI (kg/m2): 33.5 (12-31-23 @ 14:39)  HbA1c: A1C with Estimated Average Glucose Result: 5.2 % (01-01-24 @ 09:06)  TSH-2.81    Glucose:   BP: 146/87 (12-31-23 @ 13:36) (146/87 - 146/87)Vital Signs Last 24 Hrs  T(C): 36.4 (01-03-24 @ 07:44), Max: 36.4 (01-03-24 @ 07:44)  T(F): 97.6 (01-03-24 @ 07:44), Max: 97.6 (01-03-24 @ 07:44)  HR: --  BP: --  BP(mean): --  RR: 16 (01-03-24 @ 07:44) (16 - 16)  SpO2: 98% (01-03-24 @ 07:44) (98% - 98%)    Orthostatic VS  01-03-24 @ 07:44  Lying BP: --/-- HR: --  Sitting BP: 134/71 HR: 90  Standing BP: 133/87 HR: 99  Site: upper right arm  Mode: electronic  Orthostatic VS  01-02-24 @ 08:10  Lying BP: --/-- HR: --  Sitting BP: 129/81 HR: 96  Standing BP: 129/83 HR: 102  Site: upper right arm  Mode: electronic    Lipid Panel: Date/Time: 01-01-24 @ 09:06  Cholesterol, Serum: 182  LDL Cholesterol Calculated: 80  HDL Cholesterol, Serum: 88  Triglycerides, Serum: 81

## 2024-01-19 NOTE — BH INPATIENT PSYCHIATRY PROGRESS NOTE - NSTXPSYCHOINTERMD_PSY_ALL_CORE
pt started on Risperdal

## 2024-01-19 NOTE — BH INPATIENT PSYCHIATRY PROGRESS NOTE - CURRENT MEDICATION
MEDICATIONS  (STANDING):  fluticasone propionate 50 MICROgram(s)/spray Nasal Spray 1 Spray(s) Both Nostrils two times a day  melatonin 5 milliGRAM(s) Oral at bedtime  risperiDONE  Disintegrating Tablet 1 milliGRAM(s) Oral two times a day  traZODone 100 milliGRAM(s) Oral at bedtime    MEDICATIONS  (PRN):  acetaminophen     Tablet .. 650 milliGRAM(s) Oral every 6 hours PRN Moderate Pain (4 - 6)  benzocaine/menthol Lozenge 1 Lozenge Oral every 4 hours PRN sore throat  hydrOXYzine hydrochloride 25 milliGRAM(s) Oral every 8 hours PRN Anxiety  QUEtiapine 50 milliGRAM(s) Oral at bedtime PRN ADONAY  
MEDICATIONS  (STANDING):  cephalexin 500 milliGRAM(s) Oral four times a day  melatonin 5 milliGRAM(s) Oral at bedtime  risperiDONE   Tablet 2 milliGRAM(s) Oral at bedtime  risperiDONE   Tablet 1 milliGRAM(s) Oral daily  traZODone 100 milliGRAM(s) Oral at bedtime    MEDICATIONS  (PRN):  hydrOXYzine hydrochloride 25 milliGRAM(s) Oral every 8 hours PRN Anxiety  QUEtiapine 50 milliGRAM(s) Oral at bedtime PRN ADONAY  
MEDICATIONS  (STANDING):  cephalexin 500 milliGRAM(s) Oral four times a day  melatonin 5 milliGRAM(s) Oral at bedtime  risperiDONE  Disintegrating Tablet 1 milliGRAM(s) Oral two times a day  traZODone 100 milliGRAM(s) Oral at bedtime    MEDICATIONS  (PRN):  hydrOXYzine hydrochloride 25 milliGRAM(s) Oral every 8 hours PRN Anxiety  QUEtiapine 50 milliGRAM(s) Oral at bedtime PRN ADONAY  
MEDICATIONS  (STANDING):  fluticasone propionate 50 MICROgram(s)/spray Nasal Spray 1 Spray(s) Both Nostrils two times a day  melatonin 5 milliGRAM(s) Oral at bedtime  risperiDONE  Disintegrating Tablet 1 milliGRAM(s) Oral two times a day  traZODone 100 milliGRAM(s) Oral at bedtime    MEDICATIONS  (PRN):  acetaminophen     Tablet .. 650 milliGRAM(s) Oral every 6 hours PRN Moderate Pain (4 - 6)  benzocaine/menthol Lozenge 1 Lozenge Oral every 4 hours PRN sore throat  hydrOXYzine hydrochloride 25 milliGRAM(s) Oral every 8 hours PRN Anxiety  QUEtiapine 50 milliGRAM(s) Oral at bedtime PRN ADONAY  
MEDICATIONS  (STANDING):  cephalexin 500 milliGRAM(s) Oral four times a day  risperiDONE   Tablet 0.5 milliGRAM(s) Oral two times a day  traZODone 50 milliGRAM(s) Oral at bedtime    MEDICATIONS  (PRN):  hydrOXYzine hydrochloride 25 milliGRAM(s) Oral every 8 hours PRN Anxiety  
MEDICATIONS  (STANDING):  cephalexin 500 milliGRAM(s) Oral four times a day  melatonin 5 milliGRAM(s) Oral at bedtime  risperiDONE  Disintegrating Tablet 2 milliGRAM(s) Oral two times a day  traZODone 100 milliGRAM(s) Oral at bedtime    MEDICATIONS  (PRN):  hydrOXYzine hydrochloride 25 milliGRAM(s) Oral every 8 hours PRN Anxiety  QUEtiapine 50 milliGRAM(s) Oral at bedtime PRN ADONAY  
MEDICATIONS  (STANDING):  cephalexin 500 milliGRAM(s) Oral four times a day  melatonin 5 milliGRAM(s) Oral at bedtime  risperiDONE   Tablet 2 milliGRAM(s) Oral at bedtime  risperiDONE   Tablet 1 milliGRAM(s) Oral daily  traZODone 50 milliGRAM(s) Oral at bedtime    MEDICATIONS  (PRN):  hydrOXYzine hydrochloride 25 milliGRAM(s) Oral every 8 hours PRN Anxiety  
MEDICATIONS  (STANDING):  cephalexin 500 milliGRAM(s) Oral four times a day  risperiDONE   Tablet 0.5 milliGRAM(s) Oral two times a day  traZODone 50 milliGRAM(s) Oral at bedtime    MEDICATIONS  (PRN):  hydrOXYzine hydrochloride 25 milliGRAM(s) Oral every 8 hours PRN Anxiety  
MEDICATIONS  (STANDING):  cephalexin 500 milliGRAM(s) Oral four times a day  melatonin 5 milliGRAM(s) Oral at bedtime  risperiDONE   Tablet 2 milliGRAM(s) Oral at bedtime  traZODone 100 milliGRAM(s) Oral at bedtime    MEDICATIONS  (PRN):  hydrOXYzine hydrochloride 25 milliGRAM(s) Oral every 8 hours PRN Anxiety  QUEtiapine 50 milliGRAM(s) Oral at bedtime PRN ADONAY  
MEDICATIONS  (STANDING):  cephalexin 500 milliGRAM(s) Oral four times a day  risperiDONE   Tablet 0.5 milliGRAM(s) Oral two times a day  traZODone 50 milliGRAM(s) Oral at bedtime    MEDICATIONS  (PRN):  hydrOXYzine hydrochloride 25 milliGRAM(s) Oral every 8 hours PRN Anxiety  

## 2024-01-19 NOTE — BH INPATIENT PSYCHIATRY PROGRESS NOTE - PRN MEDS
MEDICATIONS  (PRN):  hydrOXYzine hydrochloride 25 milliGRAM(s) Oral every 8 hours PRN Anxiety  QUEtiapine 50 milliGRAM(s) Oral at bedtime PRN ADONAY  
MEDICATIONS  (PRN):  acetaminophen     Tablet .. 650 milliGRAM(s) Oral every 6 hours PRN Moderate Pain (4 - 6)  benzocaine/menthol Lozenge 1 Lozenge Oral every 4 hours PRN sore throat  hydrOXYzine hydrochloride 25 milliGRAM(s) Oral every 8 hours PRN Anxiety  QUEtiapine 50 milliGRAM(s) Oral at bedtime PRN ADONAY  
MEDICATIONS  (PRN):  hydrOXYzine hydrochloride 25 milliGRAM(s) Oral every 8 hours PRN Anxiety  
MEDICATIONS  (PRN):  acetaminophen     Tablet .. 650 milliGRAM(s) Oral every 6 hours PRN Moderate Pain (4 - 6)  benzocaine/menthol Lozenge 1 Lozenge Oral every 4 hours PRN sore throat  hydrOXYzine hydrochloride 25 milliGRAM(s) Oral every 8 hours PRN Anxiety  QUEtiapine 50 milliGRAM(s) Oral at bedtime PRN ADONAY  
MEDICATIONS  (PRN):  hydrOXYzine hydrochloride 25 milliGRAM(s) Oral every 8 hours PRN Anxiety  QUEtiapine 50 milliGRAM(s) Oral at bedtime PRN ADONAY  
MEDICATIONS  (PRN):  hydrOXYzine hydrochloride 25 milliGRAM(s) Oral every 8 hours PRN Anxiety  
MEDICATIONS  (PRN):  acetaminophen     Tablet .. 650 milliGRAM(s) Oral every 6 hours PRN Moderate Pain (4 - 6)  benzocaine/menthol Lozenge 1 Lozenge Oral every 4 hours PRN sore throat  hydrOXYzine hydrochloride 25 milliGRAM(s) Oral every 8 hours PRN Anxiety  QUEtiapine 50 milliGRAM(s) Oral at bedtime PRN ADONAY  
MEDICATIONS  (PRN):  acetaminophen     Tablet .. 650 milliGRAM(s) Oral every 6 hours PRN Moderate Pain (4 - 6)  benzocaine/menthol Lozenge 1 Lozenge Oral every 4 hours PRN sore throat  hydrOXYzine hydrochloride 25 milliGRAM(s) Oral every 8 hours PRN Anxiety  QUEtiapine 50 milliGRAM(s) Oral at bedtime PRN ADONAY  
MEDICATIONS  (PRN):  hydrOXYzine hydrochloride 25 milliGRAM(s) Oral every 8 hours PRN Anxiety  
MEDICATIONS  (PRN):  hydrOXYzine hydrochloride 25 milliGRAM(s) Oral every 8 hours PRN Anxiety  QUEtiapine 50 milliGRAM(s) Oral at bedtime PRN ADONAY  
MEDICATIONS  (PRN):  hydrOXYzine hydrochloride 25 milliGRAM(s) Oral every 8 hours PRN Anxiety  
MEDICATIONS  (PRN):  hydrOXYzine hydrochloride 25 milliGRAM(s) Oral every 8 hours PRN Anxiety  QUEtiapine 50 milliGRAM(s) Oral at bedtime PRN ADONAY

## 2024-01-19 NOTE — BH INPATIENT PSYCHIATRY PROGRESS NOTE - NSTXANXGOAL_PSY_ALL_CORE
Report a reduction in panic attacks and improving mood and confidence

## 2024-01-19 NOTE — BH INPATIENT PSYCHIATRY PROGRESS NOTE - NSICDXBHPRIMARYDX_PSY_ALL_CORE
Psychosis, unspecified psychosis type   F29  
Schizophrenia   F20.9  
Psychosis, unspecified psychosis type   F29  
Schizophrenia   F20.9  
Psychosis, unspecified psychosis type   F29  
Psychosis, unspecified psychosis type   F29

## 2024-01-19 NOTE — BH INPATIENT PSYCHIATRY PROGRESS NOTE - NSTXDCOTHRGOAL_PSY_ALL_CORE
Pt. will have appt. for ongoing mental health care within 5-7 days of discharge. Pt. will have safe housing upon discharge. Pt. is insured and benefits are in place. SW to explore w/ pt. the interest and need for substance use referral for discharge.
Patient will have safe placement upon d/c from [facility] and she will have an appointment for continuing in the appropriate level of psychiatric outpatient treatment within 5 business days of d/c from [facility].
Patient will have safe placement upon d/c from [facility] and she will have an appointment for continuing in the appropriate level of psychiatric outpatient treatment within 5 business days of d/c from [facility].
Pt. will have appt. for ongoing mental health care within 5-7 days of discharge.   Pt. will have safe housing upon discharge.   Pt. is insured and benefits are in place.   SW to explore w/ pt. the interest and need for substance use referral for discharge.
Pt. will have appt. for ongoing mental health care within 5-7 days of discharge.   Pt. will have safe housing upon discharge.   Pt. is insured and benefits are in place.   SW to explore w/ pt. the interest and need for substance use referral for discharge.
Patient will have safe placement upon d/c from [facility] and she will have an appointment for continuing in the appropriate level of psychiatric outpatient treatment within 5 business days of d/c from [facility].
Pt. will have appt. for ongoing mental health care within 5-7 days of discharge.   Pt. will have safe housing upon discharge.   Pt. is insured and benefits are in place.   SW to explore w/ pt. the interest and need for substance use referral for discharge.
Patient will have safe placement upon d/c from [facility] and she will have an appointment for continuing in the appropriate level of psychiatric outpatient treatment within 5 business days of d/c from [facility].
Pt. will have appt. for ongoing mental health care within 5-7 days of discharge.   Pt. will have safe housing upon discharge.   Pt. is insured and benefits are in place.   SW to explore w/ pt. the interest and need for substance use referral for discharge.
Patient will have safe placement upon d/c from [facility] and she will have an appointment for continuing in the appropriate level of psychiatric outpatient treatment within 5 business days of d/c from [facility].
Pt. will have appt. for ongoing mental health care within 5-7 days of discharge.   Pt. will have safe housing upon discharge.   Pt. is insured and benefits are in place.   SW to explore w/ pt. the interest and need for substance use referral for discharge.
Patient will have safe placement upon d/c from [facility] and she will have an appointment for continuing in the appropriate level of psychiatric outpatient treatment within 5 business days of d/c from [facility].

## 2024-01-19 NOTE — BH INPATIENT PSYCHIATRY PROGRESS NOTE - NSBHMSEBODY_PSY_A_CORE
Overweight

## 2024-01-19 NOTE — BH INPATIENT PSYCHIATRY PROGRESS NOTE - NSBHMSEKNOWHOW_PSY_ALL_CORE
Current Events/Vocabulary

## 2024-01-19 NOTE — BH INPATIENT PSYCHIATRY PROGRESS NOTE - NSBHADMITIPREASONDETAILS_PSY_A_CORE FT
pt with hx of two attempts
pt has since has support of her friend and has a place to stay as she is not wanting to return to live with her

## 2024-01-19 NOTE — BH INPATIENT PSYCHIATRY PROGRESS NOTE - NSTXCOPEDATETRGT_PSY_ALL_CORE
15-[place]incy-2024
21-Jan-2024
08-Jan-2024
15-Quincy-2024
15-Quincy-2024
21-Jan-2024
21-Jan-2024
15-Quincy-2024
21-Jan-2024
08-Jan-2024
15-Quincy-2024
15-Quincy-2024
08-Jan-2024
08-Jan-2024

## 2024-01-19 NOTE — BH INPATIENT PSYCHIATRY PROGRESS NOTE - NSICDXBHSECONDARYDX_PSY_ALL_CORE
Post traumatic stress disorder (PTSD)   F43.10  

## 2024-01-19 NOTE — BH INPATIENT PSYCHIATRY PROGRESS NOTE - NSTXANXDATETRGT_PSY_ALL_CORE
14-Jan-2024
21-Jan-2024
21-Jan-2024
07-Jan-2024
07-Jan-2024
21-Jan-2024
21-Jan-2024
14-Jan-2024
07-Jan-2024
14-Jan-2024
07-Jan-2024
14-Jan-2024

## 2024-01-20 RX ADMIN — Medication 1 SPRAY(S): at 09:36

## 2024-01-20 RX ADMIN — Medication 1 SPRAY(S): at 20:47

## 2024-01-20 RX ADMIN — Medication 100 MILLIGRAM(S): at 20:47

## 2024-01-20 RX ADMIN — Medication 5 MILLIGRAM(S): at 20:47

## 2024-01-20 RX ADMIN — RISPERIDONE 1 MILLIGRAM(S): 4 TABLET ORAL at 20:47

## 2024-01-20 RX ADMIN — RISPERIDONE 1 MILLIGRAM(S): 4 TABLET ORAL at 09:35

## 2024-01-21 RX ADMIN — Medication 5 MILLIGRAM(S): at 21:04

## 2024-01-21 RX ADMIN — Medication 1 SPRAY(S): at 09:14

## 2024-01-21 RX ADMIN — Medication 1 SPRAY(S): at 21:05

## 2024-01-21 RX ADMIN — RISPERIDONE 1 MILLIGRAM(S): 4 TABLET ORAL at 09:14

## 2024-01-21 RX ADMIN — Medication 100 MILLIGRAM(S): at 21:05

## 2024-01-21 RX ADMIN — RISPERIDONE 1 MILLIGRAM(S): 4 TABLET ORAL at 21:04

## 2024-01-22 VITALS — TEMPERATURE: 98 F | OXYGEN SATURATION: 98 % | RESPIRATION RATE: 16 BRPM

## 2024-01-22 PROCEDURE — 99239 HOSP IP/OBS DSCHRG MGMT >30: CPT

## 2024-01-22 RX ORDER — RISPERIDONE 4 MG/1
1 TABLET ORAL
Qty: 30 | Refills: 1
Start: 2024-01-22 | End: 2024-02-20

## 2024-01-22 RX ORDER — CALCIUM CARBONATE 500(1250)
2 TABLET ORAL
Refills: 0 | DISCHARGE

## 2024-01-22 RX ORDER — TRAZODONE HCL 50 MG
1 TABLET ORAL
Qty: 0 | Refills: 0 | DISCHARGE
Start: 2024-01-22 | End: 2024-02-03

## 2024-01-22 RX ORDER — LANOLIN ALCOHOL/MO/W.PET/CERES
1 CREAM (GRAM) TOPICAL
Qty: 15 | Refills: 0
Start: 2024-01-22 | End: 2024-02-05

## 2024-01-22 RX ORDER — OMEGA-3 ACID ETHYL ESTERS 1 G
1 CAPSULE ORAL
Qty: 15 | Refills: 1
Start: 2024-01-22 | End: 2024-02-20

## 2024-01-22 RX ORDER — MAGNESIUM OXIDE 400 MG ORAL TABLET 241.3 MG
1 TABLET ORAL
Refills: 0 | DISCHARGE

## 2024-01-22 RX ORDER — CYST/ALA/Q10/PHOS.SER/DHA/BROC 100-20-50
1 POWDER (GRAM) ORAL
Refills: 0 | DISCHARGE

## 2024-01-22 RX ORDER — OMEGA-3 ACID ETHYL ESTERS 1 G
1 CAPSULE ORAL
Refills: 0 | DISCHARGE

## 2024-01-22 RX ADMIN — Medication 1 SPRAY(S): at 09:38

## 2024-01-22 RX ADMIN — RISPERIDONE 1 MILLIGRAM(S): 4 TABLET ORAL at 09:37

## 2024-01-22 NOTE — BH INPATIENT PSYCHIATRY DISCHARGE NOTE - NSBHFUPINTERVALHXFT_PSY_A_CORE
Pt with plans to live with a friend and then find a place for herself.  Pt has decided to not return to live with her . Pt denies any suicidal idetion intent or plan

## 2024-01-22 NOTE — BH INPATIENT PSYCHIATRY DISCHARGE NOTE - NSBHMETABOLIC_PSY_ALL_CORE_FT
BMI: BMI (kg/m2): 33.5 (12-31-23 @ 14:39)  HbA1c: A1C with Estimated Average Glucose Result: 5.2 % (01-01-24 @ 09:06)    Glucose:   BP: --Vital Signs Last 24 Hrs  T(C): 36.4 (01-22-24 @ 07:42), Max: 36.4 (01-22-24 @ 07:42)  T(F): 97.6 (01-22-24 @ 07:42), Max: 97.6 (01-22-24 @ 07:42)  HR: --  BP: --  BP(mean): --  RR: 16 (01-22-24 @ 07:42) (16 - 16)  SpO2: 98% (01-22-24 @ 07:42) (98% - 98%)    Orthostatic VS  01-22-24 @ 07:42  Lying BP: --/-- HR: --  Sitting BP: 109/72 HR: 96  Standing BP: 116/76 HR: 100  Site: upper right arm  Mode: electronic  Orthostatic VS  01-21-24 @ 08:12  Lying BP: --/-- HR: --  Sitting BP: 138/72 HR: 91  Standing BP: 139/87 HR: 91  Site: upper right arm  Mode: electronic    Lipid Panel: Date/Time: 01-01-24 @ 09:06  Cholesterol, Serum: 182  LDL Cholesterol Calculated: 80  HDL Cholesterol, Serum: 88  Total Cholesterol/HDL Ration Measurement: --  Triglycerides, Serum: 81

## 2024-01-22 NOTE — BH INPATIENT PSYCHIATRY DISCHARGE NOTE - HOSPITAL COURSE
Pt with some decrease in the intensity of anxiety but pt remained with paranoid thoughts concerning her  and was intending to not return to live with him.  Pt claiming that she was concerned that she would become so stressed with her living with her  that she would have a return of suicidal ideation that she had in the past.  Pt  intending to live with a friend and continue to located a new place to live. Pt with denial of any suicidal ideation intent or plan .  Pt only willing to continue with  Risperdal at only 2mg a day and not willing to have any additional medication .  Met with pt CSW and the pt's friend and verified that the pt will have assistance from the friend as she looks to find a new place to live in the future as she is not willing to return to live with her  .  Pt refused to allow any contact with the  and did not allow even a family meeting .

## 2024-01-22 NOTE — BH INPATIENT PSYCHIATRY DISCHARGE NOTE - NSBHDCRISKMITIGATEFT_PSY_ALL_CORE
Pt declined the need for more medication and declined the need for the medication to be converted  to injectable form

## 2024-01-22 NOTE — BH INPATIENT PSYCHIATRY DISCHARGE NOTE - NSDCMRMEDTOKEN_GEN_ALL_CORE_FT
melatonin 5 mg oral tablet: 1 tab(s) orally once a day (at bedtime)  Omega-3 1000 mg oral capsule: 1 cap(s) orally once a day  risperiDONE 1 mg oral tablet: 1 tab(s) orally 2 times a day  traZODone 100 mg oral tablet: 1 tab(s) orally once a day (at bedtime)
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
## 2024-01-22 NOTE — BH INPATIENT PSYCHIATRY DISCHARGE NOTE - NSBHASSESSSUMMFT_PSY_ALL_CORE
low risk for suicide as the pt denies any ideation intent or plan Pt with plans to live with her friend   mitigating pt on medication

## 2024-01-23 NOTE — CHART NOTE - NSCHARTNOTEFT_GEN_A_CORE
Contacted patient to follow up after discharge. Direct contact made with patient who reported she made it home safely. Denies SI/HI.

## 2024-01-31 DIAGNOSIS — F29 UNSPECIFIED PSYCHOSIS NOT DUE TO A SUBSTANCE OR KNOWN PHYSIOLOGICAL CONDITION: ICD-10-CM

## 2024-01-31 DIAGNOSIS — I10 ESSENTIAL (PRIMARY) HYPERTENSION: ICD-10-CM

## 2024-01-31 DIAGNOSIS — F41.9 ANXIETY DISORDER, UNSPECIFIED: ICD-10-CM

## 2024-01-31 DIAGNOSIS — F43.12 POST-TRAUMATIC STRESS DISORDER, CHRONIC: ICD-10-CM

## 2024-01-31 DIAGNOSIS — Z91.51 PERSONAL HISTORY OF SUICIDAL BEHAVIOR: ICD-10-CM

## 2024-01-31 DIAGNOSIS — N39.0 URINARY TRACT INFECTION, SITE NOT SPECIFIED: ICD-10-CM

## 2024-01-31 DIAGNOSIS — E66.3 OVERWEIGHT: ICD-10-CM

## 2024-01-31 DIAGNOSIS — F22 DELUSIONAL DISORDERS: ICD-10-CM

## 2024-10-11 NOTE — BH INPATIENT PSYCHIATRY ASSESSMENT NOTE - NSBHMETABOLIC_PSY_ALL_CORE_FT
BMI: BMI (kg/m2): 33.5 (12-31-23 @ 14:39)  HbA1c: A1C with Estimated Average Glucose Result: 5.2 % (01-01-24 @ 09:06)    Glucose:   BP: 146/87 (12-31-23 @ 13:36) (130/73 - 153/75)Vital Signs Last 24 Hrs  T(C): 36.7 (01-01-24 @ 06:53), Max: 36.7 (01-01-24 @ 06:53)  T(F): 98 (01-01-24 @ 06:53), Max: 98 (01-01-24 @ 06:53)  HR: --  BP: --  BP(mean): --  RR: 18 (01-01-24 @ 06:53) (18 - 18)  SpO2: 100% (01-01-24 @ 06:53) (100% - 100%)    Orthostatic VS  01-01-24 @ 06:53  Lying BP: --/-- HR: --  Sitting BP: 155/76 HR: 97  Standing BP: 142/84 HR: 103  Site: upper right arm  Mode: electronic  Orthostatic VS  12-31-23 @ 14:39  Lying BP: --/-- HR: --  Sitting BP: 148/76 HR: 90  Standing BP: 137/73 HR: 90  Site: --  Mode: --    Lipid Panel: 
Prachi

## 2025-01-22 NOTE — BH SOCIAL WORK INITIAL PSYCHOSOCIAL EVALUATION - LEGAL HELP
[facility]  [address] [address]  Dept: [phone]  Dept Fax: [phone]    Behavioral Health Services Progress Note      Patient:  Capri Yin    :  1972    Date of Service:  2025    The encounter diagnosis was Generalized anxiety disorder.    45 minutes were spent with the patient in a video encounter.    Data and Progress toward goal(s) and objective(s):  Patient reported experiencing symptoms of anxiety (excessive worry, difficulty controlling worry, rumination).     Intervention:  Insight Oriented Strategies    Patient continues to be involved in service planning:  YES    Describe above interventions:  Discussed with patient her experiences of symptoms. Further discussed patient's plan for ending her romantic relationship. Discussed patient's feelings about ending the relationship and worries about the conversation that will actually end the relationship. Provided active and empathetic listening.     Patient's response to interventions:  Patient was open and honest about her experiences. She was engaged in the discussion and seemed eager to discuss her experiences with the therapist.     Continue to support patient's efforts and progress towards established treatment plan goals in the following ways:  Patient would like to continue to learn more about her symptoms and how to better manage them. She would also like to process the ending of her romantic relationship and adjustment following the break-up.     This visit is being performed virtually via Video visit using [employer].   Clinical Location: [facility]  Capri is physically present in her home in the [facility] [place] at the time of this visit.      Alanis Lubin    
no

## 2025-03-28 NOTE — BH TREATMENT PLAN - NSTXCAREGIVERPARTICIPATE_PSY_P_CORE
03/28/25 1108   OTHER   Discipline physical therapist   Rehab Time/Intention   Session Not Performed other (see comments);unable to treat, medical status change  (discussed w RN, pt not appropriate for PT at this time. also awaiting goals of care conversation. will follow up as appropriate.)   Recommendation   PT - Next Appointment 03/29/25        No, patient unwilling to involve family/caregiver

## 2025-07-30 NOTE — H&P ADULT - MLM HIDDEN
Patient mobility status ambulates with no difficulty.     I have reviewed discharge instructions with the patient.  The patient verbalized understanding.    Patient left ED via Discharge Method: ambulatory to Home with Spouse.    Opportunity for questions and clarification provided.     Patient given 2 scripts.           Loree Carver RN  07/30/25 [id number]    
yes